# Patient Record
Sex: FEMALE | Race: BLACK OR AFRICAN AMERICAN | NOT HISPANIC OR LATINO | URBAN - METROPOLITAN AREA
[De-identification: names, ages, dates, MRNs, and addresses within clinical notes are randomized per-mention and may not be internally consistent; named-entity substitution may affect disease eponyms.]

---

## 2019-11-16 ENCOUNTER — EMERGENCY (EMERGENCY)
Facility: HOSPITAL | Age: 74
LOS: 1 days | Discharge: HOME | End: 2019-11-16
Attending: EMERGENCY MEDICINE
Payer: MEDICARE

## 2019-11-16 VITALS
HEIGHT: 69 IN | DIASTOLIC BLOOD PRESSURE: 147 MMHG | HEART RATE: 68 BPM | RESPIRATION RATE: 20 BRPM | TEMPERATURE: 98 F | OXYGEN SATURATION: 98 % | SYSTOLIC BLOOD PRESSURE: 193 MMHG | WEIGHT: 164.91 LBS

## 2019-11-16 VITALS
RESPIRATION RATE: 18 BRPM | SYSTOLIC BLOOD PRESSURE: 136 MMHG | DIASTOLIC BLOOD PRESSURE: 84 MMHG | OXYGEN SATURATION: 98 % | HEART RATE: 95 BPM

## 2019-11-16 LAB
ALBUMIN SERPL ELPH-MCNC: 4.7 G/DL — SIGNIFICANT CHANGE UP (ref 3.3–5.2)
ALP SERPL-CCNC: 119 U/L — SIGNIFICANT CHANGE UP (ref 40–120)
ALT FLD-CCNC: 13 U/L — SIGNIFICANT CHANGE UP
ANION GAP SERPL CALC-SCNC: 17 MMOL/L — SIGNIFICANT CHANGE UP (ref 5–17)
AST SERPL-CCNC: 20 U/L — SIGNIFICANT CHANGE UP
BASOPHILS # BLD AUTO: 0.03 K/UL — SIGNIFICANT CHANGE UP (ref 0–0.2)
BASOPHILS NFR BLD AUTO: 0.7 % — SIGNIFICANT CHANGE UP (ref 0–2)
BILIRUB SERPL-MCNC: 0.7 MG/DL — SIGNIFICANT CHANGE UP (ref 0.4–2)
BUN SERPL-MCNC: 12 MG/DL — SIGNIFICANT CHANGE UP (ref 8–20)
CALCIUM SERPL-MCNC: 11.1 MG/DL — HIGH (ref 8.6–10.2)
CHLORIDE SERPL-SCNC: 100 MMOL/L — SIGNIFICANT CHANGE UP (ref 98–107)
CO2 SERPL-SCNC: 23 MMOL/L — SIGNIFICANT CHANGE UP (ref 22–29)
CREAT SERPL-MCNC: 0.91 MG/DL — SIGNIFICANT CHANGE UP (ref 0.5–1.3)
EOSINOPHIL # BLD AUTO: 0.03 K/UL — SIGNIFICANT CHANGE UP (ref 0–0.5)
EOSINOPHIL NFR BLD AUTO: 0.7 % — SIGNIFICANT CHANGE UP (ref 0–6)
GLUCOSE SERPL-MCNC: 125 MG/DL — HIGH (ref 70–115)
HCT VFR BLD CALC: 43 % — SIGNIFICANT CHANGE UP (ref 34.5–45)
HGB BLD-MCNC: 13.3 G/DL — SIGNIFICANT CHANGE UP (ref 11.5–15.5)
IMM GRANULOCYTES NFR BLD AUTO: 0.2 % — SIGNIFICANT CHANGE UP (ref 0–1.5)
LYMPHOCYTES # BLD AUTO: 1.32 K/UL — SIGNIFICANT CHANGE UP (ref 1–3.3)
LYMPHOCYTES # BLD AUTO: 30.6 % — SIGNIFICANT CHANGE UP (ref 13–44)
MCHC RBC-ENTMCNC: 27.5 PG — SIGNIFICANT CHANGE UP (ref 27–34)
MCHC RBC-ENTMCNC: 30.9 GM/DL — LOW (ref 32–36)
MCV RBC AUTO: 88.8 FL — SIGNIFICANT CHANGE UP (ref 80–100)
MONOCYTES # BLD AUTO: 0.26 K/UL — SIGNIFICANT CHANGE UP (ref 0–0.9)
MONOCYTES NFR BLD AUTO: 6 % — SIGNIFICANT CHANGE UP (ref 2–14)
NEUTROPHILS # BLD AUTO: 2.66 K/UL — SIGNIFICANT CHANGE UP (ref 1.8–7.4)
NEUTROPHILS NFR BLD AUTO: 61.8 % — SIGNIFICANT CHANGE UP (ref 43–77)
PLATELET # BLD AUTO: 277 K/UL — SIGNIFICANT CHANGE UP (ref 150–400)
POTASSIUM SERPL-MCNC: 4.5 MMOL/L — SIGNIFICANT CHANGE UP (ref 3.5–5.3)
POTASSIUM SERPL-SCNC: 4.5 MMOL/L — SIGNIFICANT CHANGE UP (ref 3.5–5.3)
PROT SERPL-MCNC: 9.1 G/DL — HIGH (ref 6.6–8.7)
RBC # BLD: 4.84 M/UL — SIGNIFICANT CHANGE UP (ref 3.8–5.2)
RBC # FLD: 13 % — SIGNIFICANT CHANGE UP (ref 10.3–14.5)
SODIUM SERPL-SCNC: 140 MMOL/L — SIGNIFICANT CHANGE UP (ref 135–145)
TROPONIN T SERPL-MCNC: <0.01 NG/ML — SIGNIFICANT CHANGE UP (ref 0–0.06)
WBC # BLD: 4.31 K/UL — SIGNIFICANT CHANGE UP (ref 3.8–10.5)
WBC # FLD AUTO: 4.31 K/UL — SIGNIFICANT CHANGE UP (ref 3.8–10.5)

## 2019-11-16 PROCEDURE — 99284 EMERGENCY DEPT VISIT MOD MDM: CPT | Mod: 25,GC

## 2019-11-16 PROCEDURE — 85027 COMPLETE CBC AUTOMATED: CPT

## 2019-11-16 PROCEDURE — 96374 THER/PROPH/DIAG INJ IV PUSH: CPT | Mod: XU

## 2019-11-16 PROCEDURE — 93005 ELECTROCARDIOGRAM TRACING: CPT

## 2019-11-16 PROCEDURE — 71046 X-RAY EXAM CHEST 2 VIEWS: CPT

## 2019-11-16 PROCEDURE — 93010 ELECTROCARDIOGRAM REPORT: CPT

## 2019-11-16 PROCEDURE — 99284 EMERGENCY DEPT VISIT MOD MDM: CPT | Mod: 25

## 2019-11-16 PROCEDURE — 71046 X-RAY EXAM CHEST 2 VIEWS: CPT | Mod: 26

## 2019-11-16 PROCEDURE — 80053 COMPREHEN METABOLIC PANEL: CPT

## 2019-11-16 PROCEDURE — 36415 COLL VENOUS BLD VENIPUNCTURE: CPT

## 2019-11-16 PROCEDURE — 36000 PLACE NEEDLE IN VEIN: CPT | Mod: LT,XU

## 2019-11-16 PROCEDURE — 84484 ASSAY OF TROPONIN QUANT: CPT

## 2019-11-16 PROCEDURE — 71045 X-RAY EXAM CHEST 1 VIEW: CPT

## 2019-11-16 PROCEDURE — 71045 X-RAY EXAM CHEST 1 VIEW: CPT | Mod: 26,59

## 2019-11-16 RX ORDER — LABETALOL HCL 100 MG
100 TABLET ORAL ONCE
Refills: 0 | Status: COMPLETED | OUTPATIENT
Start: 2019-11-16 | End: 2019-11-16

## 2019-11-16 RX ORDER — LABETALOL HCL 100 MG
10 TABLET ORAL ONCE
Refills: 0 | Status: COMPLETED | OUTPATIENT
Start: 2019-11-16 | End: 2019-11-16

## 2019-11-16 RX ADMIN — Medication 100 MILLIGRAM(S): at 20:11

## 2019-11-16 RX ADMIN — Medication 10 MILLIGRAM(S): at 21:25

## 2019-11-16 NOTE — ED PROVIDER NOTE - PROGRESS NOTE DETAILS
AJM: VS improved pt feeling well. workup neg. pt has cards follow up this week. return precautions given with daughter present. nomral exam (neuro exam unchanged form baseline)

## 2019-11-16 NOTE — ED ADULT TRIAGE NOTE - CHIEF COMPLAINT QUOTE
Patient arrived today from [facility], daughter states patient is C/O chest pain, palpitations and weakness, HX of stroke 6-8 months.

## 2019-11-16 NOTE — ED PROVIDER NOTE - PATIENT PORTAL LINK FT
You can access the FollowMyHealth Patient Portal offered by [facility] by registering at the following website: [email or web address]. By joining [employer]’s FollowMyHealth portal, you will also be able to view your health information using other applications (apps) compatible with our system.

## 2019-11-16 NOTE — ED PROVIDER NOTE - NS ED ROS FT
General: Denies fever, chills  HEENT: Denies sensory changes, sore throat  Neck: Denies neck pain, neck stiffness  Resp: Denies coughing, SOB  Cardiovascular: Endorses palpitations. DEnies CP, LE edema  GI: Denies nausea, vomiting, abdominal pain  : Denies dysuria, hematuria, frequency  MSK: Denies back pain  Neuro: Denies HA, dizziness, numbness, weakness  Skin: Denies rashes.

## 2019-11-16 NOTE — ED PROVIDER NOTE - PHYSICAL EXAMINATION
General: Comfortable elderly female in no acute distress.  HEENT: Normocephalic, atraumatic. Moist mucous membranes.    Eyes: No scleral icterus. EOMI. Pupils equally reactive to light and accomodation.   Neck:. Soft and supple. Full ROM without pain. No midline tenderness  Cardiac: Regular rate and regular rhythm. +S1/S2. No murmurs, rubs, gallops. Peripheral pulses 2+ and symmetric. No LE edema.  Resp: Lungs CTAB. Speaking in full sentences. No evidence of respiratory distress. No wheezes, rales or rhonchi.  Abd: Soft, non-tender, non-distended. No guarding or rebound. No scars, masses, or lesions.  Back: Spine midline and non-tender. No CVA tenderness.    Skin: No rashes, abrasions, or lacerations.  Neuro: Awake, alert & oriented x 3. Motor strength 4/5 LUE, LLE. 5/5 RUE, RLE. Sensation grossly intact. General: Comfortable elderly female in no acute distress.  HEENT: Normocephalic, atraumatic. Moist mucous membranes.    Eyes: No scleral icterus. EOMI. Pupils equally reactive to light and accomodation.   Neck:. Soft and supple. Full ROM without pain. No midline tenderness  Cardiac: Regular rate and regular rhythm. +S1/S2. No murmurs, rubs, gallops. Peripheral pulses 2+ and symmetric. No LE edema.  Resp: Lungs CTAB. Speaking in full sentences. No evidence of respiratory distress. No wheezes, rales or rhonchi.  Abd: Soft, non-tender, non-distended. No guarding or rebound. No scars, masses, or lesions.  Back: Spine midline and non-tender. No CVA tenderness.    Skin: No rashes, abrasions, or lacerations.  Neuro: Awake, alert & oriented x 3. Motor strength 4/5 LUE, LLE. 5/5 RUE, RLE (pts baseline for the past 1-2 years)  Sensation grossly intact.

## 2019-11-16 NOTE — ED PROVIDER NOTE - CLINICAL SUMMARY MEDICAL DECISION MAKING FREE TEXT BOX
Patitent with palpitations that have now resolved. Labwork and imaging unremarkable. Patient improved. Daughter confirms that patient has apponitment with [facility] this week. Patient comfortable appearing, will send home with follow up.

## 2019-11-16 NOTE — ED PROVIDER NOTE - OBJECTIVE STATEMENT
Pt is a 75yo F with no PMH presenting with palpitations. Patient states on the plane from [facility] two hours prior to presentation she began to feel strong palpitations and weakness. Patient states that with her daughter on the way to the hospital she felt better and the palpitations resolved. AS per daughter patient has a history of stroke 1 year prior with some persistent mild left sided weakness. Patient states that she does not see physicians and the only medication she takes is aspirin 81. She denies any active chest pain, SOB, syncope, dizziness, headache, fever, chills, cough.

## 2019-11-17 NOTE — ED ADULT NURSE REASSESSMENT NOTE - NS ED NURSE REASSESS COMMENT FT1
Pt. safe for discharge as per provider. Vital signs stable and as charted. Pt. at baseline neuro status. Discussed discharge teaching with pt, pt verbalized understanding. IV catheter discontinued. Pt. discharged by Dr. Villar.

## 2019-11-18 RX ORDER — LABETALOL HCL 100 MG
1 TABLET ORAL
Qty: 6 | Refills: 0
Start: 2019-11-18 | End: 2019-11-20

## 2019-12-21 ENCOUNTER — INPATIENT (INPATIENT)
Facility: HOSPITAL | Age: 74
LOS: 5 days | Discharge: HOME | DRG: 65 | End: 2019-12-27
Attending: HOSPITALIST | Admitting: STUDENT IN AN ORGANIZED HEALTH CARE EDUCATION/TRAINING PROGRAM
Payer: MEDICARE

## 2019-12-21 VITALS
HEART RATE: 77 BPM | OXYGEN SATURATION: 97 % | RESPIRATION RATE: 18 BRPM | DIASTOLIC BLOOD PRESSURE: 97 MMHG | SYSTOLIC BLOOD PRESSURE: 144 MMHG

## 2019-12-21 DIAGNOSIS — I63.9 CEREBRAL INFARCTION, UNSPECIFIED: ICD-10-CM

## 2019-12-21 LAB
ALBUMIN SERPL ELPH-MCNC: 4.6 G/DL — SIGNIFICANT CHANGE UP (ref 3.3–5.2)
ALP SERPL-CCNC: 117 U/L — SIGNIFICANT CHANGE UP (ref 40–120)
ALT FLD-CCNC: 19 U/L — SIGNIFICANT CHANGE UP
ANION GAP SERPL CALC-SCNC: 13 MMOL/L — SIGNIFICANT CHANGE UP (ref 5–17)
APPEARANCE UR: ABNORMAL
APTT BLD: 27.5 SEC — SIGNIFICANT CHANGE UP (ref 27.5–36.3)
AST SERPL-CCNC: 30 U/L — SIGNIFICANT CHANGE UP
BASOPHILS # BLD AUTO: 0.03 K/UL — SIGNIFICANT CHANGE UP (ref 0–0.2)
BASOPHILS NFR BLD AUTO: 0.6 % — SIGNIFICANT CHANGE UP (ref 0–2)
BILIRUB SERPL-MCNC: 0.8 MG/DL — SIGNIFICANT CHANGE UP (ref 0.4–2)
BILIRUB UR-MCNC: NEGATIVE — SIGNIFICANT CHANGE UP
BUN SERPL-MCNC: 13 MG/DL — SIGNIFICANT CHANGE UP (ref 8–20)
CALCIUM SERPL-MCNC: 10.9 MG/DL — HIGH (ref 8.6–10.2)
CHLORIDE SERPL-SCNC: 100 MMOL/L — SIGNIFICANT CHANGE UP (ref 98–107)
CO2 SERPL-SCNC: 25 MMOL/L — SIGNIFICANT CHANGE UP (ref 22–29)
COLOR SPEC: YELLOW — SIGNIFICANT CHANGE UP
COMMENT - URINE: SIGNIFICANT CHANGE UP
CREAT SERPL-MCNC: 0.79 MG/DL — SIGNIFICANT CHANGE UP (ref 0.5–1.3)
DIFF PNL FLD: NEGATIVE — SIGNIFICANT CHANGE UP
EOSINOPHIL # BLD AUTO: 0.17 K/UL — SIGNIFICANT CHANGE UP (ref 0–0.5)
EOSINOPHIL NFR BLD AUTO: 3.6 % — SIGNIFICANT CHANGE UP (ref 0–6)
EPI CELLS # UR: SIGNIFICANT CHANGE UP
GLUCOSE SERPL-MCNC: 103 MG/DL — SIGNIFICANT CHANGE UP (ref 70–115)
GLUCOSE UR QL: NEGATIVE MG/DL — SIGNIFICANT CHANGE UP
HCT VFR BLD CALC: 40.7 % — SIGNIFICANT CHANGE UP (ref 34.5–45)
HGB BLD-MCNC: 12.8 G/DL — SIGNIFICANT CHANGE UP (ref 11.5–15.5)
IMM GRANULOCYTES NFR BLD AUTO: 0.2 % — SIGNIFICANT CHANGE UP (ref 0–1.5)
INR BLD: 1.08 RATIO — SIGNIFICANT CHANGE UP (ref 0.88–1.16)
KETONES UR-MCNC: NEGATIVE — SIGNIFICANT CHANGE UP
LEUKOCYTE ESTERASE UR-ACNC: NEGATIVE — SIGNIFICANT CHANGE UP
LYMPHOCYTES # BLD AUTO: 1.58 K/UL — SIGNIFICANT CHANGE UP (ref 1–3.3)
LYMPHOCYTES # BLD AUTO: 33.9 % — SIGNIFICANT CHANGE UP (ref 13–44)
MAGNESIUM SERPL-MCNC: 2.6 MG/DL — SIGNIFICANT CHANGE UP (ref 1.6–2.6)
MCHC RBC-ENTMCNC: 27.9 PG — SIGNIFICANT CHANGE UP (ref 27–34)
MCHC RBC-ENTMCNC: 31.4 GM/DL — LOW (ref 32–36)
MCV RBC AUTO: 88.7 FL — SIGNIFICANT CHANGE UP (ref 80–100)
MONOCYTES # BLD AUTO: 0.41 K/UL — SIGNIFICANT CHANGE UP (ref 0–0.9)
MONOCYTES NFR BLD AUTO: 8.8 % — SIGNIFICANT CHANGE UP (ref 2–14)
NEUTROPHILS # BLD AUTO: 2.46 K/UL — SIGNIFICANT CHANGE UP (ref 1.8–7.4)
NEUTROPHILS NFR BLD AUTO: 52.9 % — SIGNIFICANT CHANGE UP (ref 43–77)
NITRITE UR-MCNC: NEGATIVE — SIGNIFICANT CHANGE UP
PH UR: 8 — SIGNIFICANT CHANGE UP (ref 5–8)
PLATELET # BLD AUTO: 276 K/UL — SIGNIFICANT CHANGE UP (ref 150–400)
POTASSIUM SERPL-MCNC: 4.7 MMOL/L — SIGNIFICANT CHANGE UP (ref 3.5–5.3)
POTASSIUM SERPL-SCNC: 4.7 MMOL/L — SIGNIFICANT CHANGE UP (ref 3.5–5.3)
PROT SERPL-MCNC: 8.6 G/DL — SIGNIFICANT CHANGE UP (ref 6.6–8.7)
PROT UR-MCNC: NEGATIVE MG/DL — SIGNIFICANT CHANGE UP
PROTHROM AB SERPL-ACNC: 12.4 SEC — SIGNIFICANT CHANGE UP (ref 10–12.9)
RBC # BLD: 4.59 M/UL — SIGNIFICANT CHANGE UP (ref 3.8–5.2)
RBC # FLD: 13.1 % — SIGNIFICANT CHANGE UP (ref 10.3–14.5)
RBC CASTS # UR COMP ASSIST: SIGNIFICANT CHANGE UP /HPF (ref 0–4)
SODIUM SERPL-SCNC: 138 MMOL/L — SIGNIFICANT CHANGE UP (ref 135–145)
SP GR SPEC: 1.01 — SIGNIFICANT CHANGE UP (ref 1.01–1.02)
TROPONIN T SERPL-MCNC: <0.01 NG/ML — SIGNIFICANT CHANGE UP (ref 0–0.06)
UROBILINOGEN FLD QL: NEGATIVE MG/DL — SIGNIFICANT CHANGE UP
WBC # BLD: 4.66 K/UL — SIGNIFICANT CHANGE UP (ref 3.8–10.5)
WBC # FLD AUTO: 4.66 K/UL — SIGNIFICANT CHANGE UP (ref 3.8–10.5)
WBC UR QL: SIGNIFICANT CHANGE UP

## 2019-12-21 PROCEDURE — 93010 ELECTROCARDIOGRAM REPORT: CPT

## 2019-12-21 PROCEDURE — 70496 CT ANGIOGRAPHY HEAD: CPT | Mod: 26

## 2019-12-21 PROCEDURE — 99223 1ST HOSP IP/OBS HIGH 75: CPT

## 2019-12-21 PROCEDURE — 72125 CT NECK SPINE W/O DYE: CPT | Mod: 26

## 2019-12-21 PROCEDURE — 70450 CT HEAD/BRAIN W/O DYE: CPT | Mod: 26,59

## 2019-12-21 PROCEDURE — 99284 EMERGENCY DEPT VISIT MOD MDM: CPT

## 2019-12-21 PROCEDURE — 70498 CT ANGIOGRAPHY NECK: CPT | Mod: 26

## 2019-12-21 PROCEDURE — 71045 X-RAY EXAM CHEST 1 VIEW: CPT | Mod: 26

## 2019-12-21 RX ORDER — HEPARIN SODIUM 5000 [USP'U]/ML
5000 INJECTION INTRAVENOUS; SUBCUTANEOUS EVERY 8 HOURS
Refills: 0 | Status: DISCONTINUED | OUTPATIENT
Start: 2019-12-21 | End: 2019-12-27

## 2019-12-21 RX ORDER — ATORVASTATIN CALCIUM 80 MG/1
80 TABLET, FILM COATED ORAL AT BEDTIME
Refills: 0 | Status: DISCONTINUED | OUTPATIENT
Start: 2019-12-21 | End: 2019-12-27

## 2019-12-21 RX ORDER — ASPIRIN/CALCIUM CARB/MAGNESIUM 324 MG
300 TABLET ORAL ONCE
Refills: 0 | Status: DISCONTINUED | OUTPATIENT
Start: 2019-12-21 | End: 2019-12-21

## 2019-12-21 RX ORDER — ASPIRIN/CALCIUM CARB/MAGNESIUM 324 MG
81 TABLET ORAL ONCE
Refills: 0 | Status: DISCONTINUED | OUTPATIENT
Start: 2019-12-21 | End: 2019-12-21

## 2019-12-21 RX ORDER — ASPIRIN/CALCIUM CARB/MAGNESIUM 324 MG
325 TABLET ORAL ONCE
Refills: 0 | Status: COMPLETED | OUTPATIENT
Start: 2019-12-21 | End: 2019-12-21

## 2019-12-21 RX ORDER — ACETAMINOPHEN 500 MG
650 TABLET ORAL EVERY 6 HOURS
Refills: 0 | Status: DISCONTINUED | OUTPATIENT
Start: 2019-12-21 | End: 2019-12-27

## 2019-12-21 RX ORDER — MORPHINE SULFATE 50 MG/1
2 CAPSULE, EXTENDED RELEASE ORAL ONCE
Refills: 0 | Status: DISCONTINUED | OUTPATIENT
Start: 2019-12-21 | End: 2019-12-21

## 2019-12-21 RX ORDER — ASPIRIN/CALCIUM CARB/MAGNESIUM 324 MG
81 TABLET ORAL DAILY
Refills: 0 | Status: DISCONTINUED | OUTPATIENT
Start: 2019-12-21 | End: 2019-12-27

## 2019-12-21 RX ADMIN — MORPHINE SULFATE 2 MILLIGRAM(S): 50 CAPSULE, EXTENDED RELEASE ORAL at 16:08

## 2019-12-21 RX ADMIN — Medication 81 MILLIGRAM(S): at 22:17

## 2019-12-21 RX ADMIN — ATORVASTATIN CALCIUM 80 MILLIGRAM(S): 80 TABLET, FILM COATED ORAL at 22:20

## 2019-12-21 RX ADMIN — HEPARIN SODIUM 5000 UNIT(S): 5000 INJECTION INTRAVENOUS; SUBCUTANEOUS at 22:20

## 2019-12-21 NOTE — H&P ADULT - ASSESSMENT
74yoF hx prior CVA with residual left sided weakness, HTN presenting with left sided facial droop and slurred speech, being admitted for CVA work up    Left sided facial droop/dysarthria  -CT head showing hypodensity in right frontal lobe concerning for acute/subacute infarct with chronic right temporal occipital infarct with superimposed age-indeterminate infarct  -Admit to step down until for q2hr neuro checks  -Passed dysphagia screen, start ASA and high potency statin  -TTE, MRI brain and carotid US ordered   -CTA head showing intracranial atherosclerosis contributing to severe stenosis of the right supraclinoid internal carotid artery, however CTA neck negative for any carotid or vertebral artery stenosis  -Carotid US ordered to further assess carotid artery given stenosis given above findings   -PT and OT eval   -Check HgbA1c and lipid panel  -Neurology ([facility]) consulted by ED, will see in AM    HTN  -Holding labetalol to allow for permissive HTN for about 24-48hr    Left hip pain  -Pt with recent fall, CT head with above findings  -X-ray hip ordered to assess for fracture   -Pain control with Tylenol    CT abnormality on cervical spine  -Numerous small rounded lucencies scattered throughout the visualized spine on CT cervical spine with nonemergent follow up MRI and/or bone scan recommended  -Findings of CT cervical spine explained by tele-hospitalist, Dr. Delaney    Prophylactic measure   -Intermittent pneumatic compressions

## 2019-12-21 NOTE — CONSULT NOTE ADULT - SUBJECTIVE AND OBJECTIVE BOX
REASON FOR CONSULT: telehospitalist evaluation        HPI: Pt is a 73 yo F presenting from home with facial droop and slurred speech. PMH CVA approx 1 month ago, HTN.   Patient seen via telehospitalist consultation. She speaks some english but her primary language is Creole.   Patient is a poor historian and she cannot provide much PMH or any of the medications she is on or when her last stroke was. History also obtained from patients daughter Faye Flores (as well as patients grandson) at [phone] and she was able to provide medication list.   As per patient and her grandson she had increased weakness since she woke up this morning, at baseline she has L sided weakness after her last stroke but now is having some weakness in her R arm and family also noted that she had slurred speech. Exact time of symptom onset is unclear, she was last known to be at her baseline last night around 9PM. Family believes she is getting weaker as time goes on over past few days. Denies headaches, nausea, vomiting, chest pain, SOB, palpitations, abdominal pain, constipation, diarrhea, melena, hematochezia, dysuria.     In ED patient had imaging done and showed Noncontrast CT Head: Redemonstration of areas of hypodensity within the right frontal lobe, concerning for acute/subacute infarct. Chronic right temporal occipital infarct with possible superimposed age-indeterminate infarct. Stable left medial frontal lobe hypodensity. No acute intracranial hemorrhage or significant mass effect.    CT cervical spine: No acute cervical spinal fracture or evidence of traumatic malalignment. Numerous small rounded lucencies scattered throughout the visualized spine. Further evaluation with nonemergent MRI and/or bone scan is recommended as findings may be seen in the setting of underlying malignancy.    CTA Neck: No flow-limiting stenosis or occlusion within the cervical carotid or vertebral arteries.     CTA Head: Intracranial atherosclerosis contributing to severe stenosis of the right supraclinoid internal carotid artery. Additional areas of scattered intracranial luminal stenoses, including the mid basilar segment, which demonstrates mild luminal stenosis.        PSH: Denies past surgeries    Social Hx: Denies use of etoh, tobacco and drug use.     Family Hx: Denies family hx of CVA in parents    Meds: see med rec    Allergies: NKDA          OBJECTIVE    Vital Signs Last 24 Hrs  T(C): --  T(F): --  HR: 81 (21 Dec 2019 19:30) (77 - 81)  BP: 139/99 (21 Dec 2019 19:30) (139/99 - 144/97)  BP(mean): --  RR: 22 (21 Dec 2019 19:30) (18 - 22)  SpO2: 97% (21 Dec 2019 19:30) (97% - 97%)        PHYSICAL EXAM: Tele-evaluation precludes physical exam.   General: Patient appears well in no acute distress, speech is fluent      LABS AND IMAGING DATA:                        12.8   4.66  )-----------( 276      ( 21 Dec 2019 13:53 )             40.7     12    138  |  100  |  13.0  ----------------------------<  103  4.7   |  25.0  |  0.79    Ca    10.9<H>      21 Dec 2019 13:53  Mg     2.6         TPro  8.6  /  Alb  4.6  /  TBili  0.8  /  DBili  x   /  AST  30  /  ALT  19  /  AlkPhos  117  12-    Urinalysis Basic - ( 21 Dec 2019 16:21 )    Color: Yellow / Appearance: Slightly Turbid / S.010 / pH: x  Gluc: x / Ketone: Negative  / Bili: Negative / Urobili: Negative mg/dL   Blood: x / Protein: Negative mg/dL / Nitrite: Negative   Leuk Esterase: Negative / RBC: 0-2 /HPF / WBC 0-2   Sq Epi: x / Non Sq Epi: Few / Bacteria: x          ASSESSMENT AND PLAN:     CVA: Place on stroke pathway.   -obtain A1c, lipids, MRI brain (no contraindications to MRI).   -neurochecks as ordered.   -consult neurology (neuro consult called by ED-Dr [facility]'s group)  -ASA daily.   -start statin  -NPO until passes dysphagia screen  -PT consult  -CT reviewed as stated above and showed R frontal lobe acute or subacute infarct as well as her chronic infarcts as stated above.   -also noted to have intracranial atherosclerosis contributing to severe stenosis of the right supraclinoid internal carotid artery-f/u neuro recommendations    HTN: permissive HTN  -will hold BP meds for right now.     Abnormal lucencies noted on CT of the spine: family informed and will need additional testing as this may be malignant/due to cancer and will need continued followed up    Care plan discussed with Dr. García

## 2019-12-21 NOTE — ED PROVIDER NOTE - OBJECTIVE STATEMENT
74yoF with h/o L hemiparesis from old stroke, which occurred in the last several months (apparently L leg weakness 6 mo ago and L arm weakness 1 mo ago),brought in by family after found to have slurred speech and R gaze preference onset sometime since last night. last known well definitive  9 PM. Per grandson, he came into her room several times through the night to help carry her to the bathroom ( she requires much assistance due to L leg weakness) and he did not notice if she had a facial droop - last time he saw her was between 5-6AM).

## 2019-12-21 NOTE — ED ADULT NURSE REASSESSMENT NOTE - NS ED NURSE REASSESS COMMENT FT1
Pt received at 1930. Chart as noted. Pt is A&OX3. Pt denies pain, N/V/D. Pt VSS, NSR on cardiac monitor. Pt in NAD at this time. Pt moves all extremities equal and bilateral. Plan of care and wait time explained. Pt awaiting admit orders.  Safety maintained.

## 2019-12-21 NOTE — ED ADULT NURSE NOTE - CHIEF COMPLAINT QUOTE
pt awake and alert, sudden onset of left sided facial droop and right sided gaze since 0700. last known well 2100 last night. MD Panchal @ bedside for eval.

## 2019-12-21 NOTE — ED ADULT NURSE NOTE - OBJECTIVE STATEMENT
Pt awake and alert brought in by EMS c/o Left side facial droop and right side gaze starting last night per EMS approx 2100, pt noticed to have symptoms by family at 0700. Hx of CVA with residual LUE and LLE paralysis. RR even and unlabored, pt acting baseline ms per family. #22G IV placed to right hand, blood work sent to lab. safety maintained, call bell in reach, family remains bedside

## 2019-12-21 NOTE — H&P ADULT - NSHPPHYSICALEXAM_GEN_ALL_CORE
Vital Signs Last 24 Hrs  T(C): 37 (21 Dec 2019 23:44), Max: 37 (21 Dec 2019 23:44)  T(F): 98.6 (21 Dec 2019 23:44), Max: 98.6 (21 Dec 2019 23:44)  HR: 85 (22 Dec 2019 04:00) (71 - 85)  BP: 127/89 (22 Dec 2019 04:00) (127/89 - 156/104)  BP(mean): 101 (22 Dec 2019 04:00) (88 - 120)  RR: 14 (22 Dec 2019 04:00) (12 - 22)  SpO2: 99% (22 Dec 2019 04:00) (97% - 100%)    GENERAL:  Well-appearing elderly female, not in acute distress  EYES:  Clear conjunctiva, extraocular movement intact  ENT: Moist mucous membranes  RESP:  Non-labored breathing pattern, lungs clear to ausculation   CV: Regular rate and rhythm, no murmurs appreciated, no lower extremity edema  GI: Soft, non-tender, non-distended  NEURO: Awake, alert, conversant, LUE contracted (chronic), pt having some difficulty understanding neuro exam, but able to move RUE and RLE against gravity with some resistance (about 4/5 strength), barely able to lift LLE off bed (about 1/5 strength)   PSYCH: Calm, cooperative  SKIN: No rash or lesions, warm and dry

## 2019-12-21 NOTE — H&P ADULT - HISTORY OF PRESENT ILLNESS
74yoF hx prior CVA with residual left sided weakness, HTN presenting with left sided facial droop and slurred speech.  Pt Pan[place]ian Creople speaking, knows limited English, so hx obtained from chart review and verbal sign out from tele-hospitalist who spoke with pt daughter Faye and pt grandson Mark.  Only granddaughter Sheryl currently at bedside and can provide limited hx.  Pt has baseline left sided weakness from recent CVA she had 1 month ago (granddaughter reports she was at [facility] during CVA, however no record in EMR.  Pt unable to move her left arm due to contracture after stroke and ambulates with a walker.  Earlier this AM, they noticed that she had a left sided facial droop and her speech was not clear.  Per granddaughter at bedside, pt still having slurred speech when she talks.  Pt also had recent fall with impact to her left side and currently endorses left hip pain to me.     On admission, CT head shows re-demonstration of areas of hypodensity within the right frontal lobe, concerning for acute/subacute infarct, chronic right temporal occipital infarct with possible superimposed age-indeterminate infarct and stable left medial frontal lobe hypodensity.  CT cervical spine showed numerous round luncencies with follow up non-emergent MRI or bone scan recommended. 74yoF hx prior CVA with residual left sided weakness, HTN presenting with left sided facial droop and slurred speech.  Pt [place] Creople speaking, knows limited English, so hx obtained from chart review and verbal sign out from tele-hospitalist who spoke with pt daughter Faye and pt grandson Mark.  Only granddaughter Sheryl currently at bedside and can provide limited hx.  Pt has baseline left sided weakness from recent CVA she had 1 month ago (granddaughter reports she was at [facility] during CVA, however no record in EMR.  Pt unable to move her left arm due to contracture after stroke and ambulates with a walker.  Earlier this AM, they noticed that she had a left sided facial droop and her speech was not clear.  Per granddaughter at bedside, pt still having slurred speech when she talks.  Pt also had recent fall with impact to her left side and currently endorses left hip pain to me.     On admission, CT head shows re-demonstration of areas of hypodensity within the right frontal lobe, concerning for acute/subacute infarct, chronic right temporal occipital infarct with possible superimposed age-indeterminate infarct and stable left medial frontal lobe hypodensity.  CT cervical spine showed numerous round luncencies with follow up non-emergent MRI or bone scan recommended.

## 2019-12-21 NOTE — ED PROVIDER NOTE - CLINICAL SUMMARY MEDICAL DECISION MAKING FREE TEXT BOX
Pt with h/o L hemiparesis pw new onset L facial droop and slurred speech;  Unclear of prior workups;  Pt out of TPA window as LNK last night; will check CT/ labs. / admi

## 2019-12-21 NOTE — H&P ADULT - HISTORY OF PRESENT ILLNESS
REASON FOR CONSULT: telehospitalist evaluation        HPI: Pt is a 73 yo F presenting from home with facial droop and slurred speech. PMH CVA approx 1 month ago, HTN.   Patient seen via telehospitalist consultation. She speaks some english but her primary language is Creole.   Patient is a poor historian and she cannot provide much PMH or any of the medications she is on or when her last stroke was. History also obtained from patients daughter Faye Flores (as well as patients grandson) at [phone] and she was able to provide medication list.   As per patient and her grandson she had increased weakness since she woke up this morning, at baseline she has L sided weakness after her last stroke but now is having some weakness in her R arm and family also noted that she had slurred speech. Exact time of symptom onset is unclear, she was last known to be at her baseline last night around 9PM. Family believes she is getting weaker as time goes on over past few days. Denies headaches, nausea, vomiting, chest pain, SOB, palpitations, abdominal pain, constipation, diarrhea, melena, hematochezia, dysuria.     In ED patient had imaging done and showed Noncontrast CT Head: Redemonstration of areas of hypodensity within the right frontal lobe, concerning for acute/subacute infarct. Chronic right temporal occipital infarct with possible superimposed age-indeterminate infarct. Stable left medial frontal lobe hypodensity. No acute intracranial hemorrhage or significant mass effect.    CT cervical spine: No acute cervical spinal fracture or evidence of traumatic malalignment. Numerous small rounded lucencies scattered throughout the visualized spine. Further evaluation with nonemergent MRI and/or bone scan is recommended as findings may be seen in the setting of underlying malignancy.    CTA Neck: No flow-limiting stenosis or occlusion within the cervical carotid or vertebral arteries.     CTA Head: Intracranial atherosclerosis contributing to severe stenosis of the right supraclinoid internal carotid artery. Additional areas of scattered intracranial luminal stenoses, including the mid basilar segment, which demonstrates mild luminal stenosis.        PSH: Denies past surgeries    Social Hx: Denies use of etoh, tobacco and drug use.     Family Hx: Denies family hx of CVA in parents    Meds: see med rec    Allergies: NKDA          OBJECTIVE    Vital Signs Last 24 Hrs  T(C): --  T(F): --  HR: 81 (21 Dec 2019 19:30) (77 - 81)  BP: 139/99 (21 Dec 2019 19:30) (139/99 - 144/97)  BP(mean): --  RR: 22 (21 Dec 2019 19:30) (18 - 22)  SpO2: 97% (21 Dec 2019 19:30) (97% - 97%)        PHYSICAL EXAM: Tele-evaluation precludes physical exam.   General: Patient appears well in no acute distress, speech is fluent      LABS AND IMAGING DATA:                        12.8   4.66  )-----------( 276      ( 21 Dec 2019 13:53 )             40.7     12    138  |  100  |  13.0  ----------------------------<  103  4.7   |  25.0  |  0.79    Ca    10.9<H>      21 Dec 2019 13:53  Mg     2.6         TPro  8.6  /  Alb  4.6  /  TBili  0.8  /  DBili  x   /  AST  30  /  ALT  19  /  AlkPhos  117  12-    Urinalysis Basic - ( 21 Dec 2019 16:21 )    Color: Yellow / Appearance: Slightly Turbid / S.010 / pH: x  Gluc: x / Ketone: Negative  / Bili: Negative / Urobili: Negative mg/dL   Blood: x / Protein: Negative mg/dL / Nitrite: Negative   Leuk Esterase: Negative / RBC: 0-2 /HPF / WBC 0-2   Sq Epi: x / Non Sq Epi: Few / Bacteria: x                ASSESSMENT AND PLAN:     CVA: Place on stroke pathway.   -obtain A1c, lipids, MRI brain (no contraindications to MRI).   -neurochecks as ordered.   -consult neurology (neuro consult called by ED-Dr Cody's group)  -ASA daily.   -start statin  -NPO until passes dysphagia screen  -PT consult  -CT reviewed as stated above and showed R frontal lobe acute or subacute infarct as well as her chronic infarcts as stated above.   -also noted to have intracranial atherosclerosis contributing to severe stenosis of the right supraclinoid internal carotid artery-f/u neuro recommendations    HTN: permissive HTN  -will hold BP meds for right now.     Abnormal lucencies noted on CT of the spine: family informed and will need additional testing as this may be malignant/due to cancer and will need continued followed up    Care plan discussed with Dr. García

## 2019-12-21 NOTE — ED PROVIDER NOTE - EYES, MLM
Clear bilaterally, pupils equal, round and reactive to light. Rward gaze preference; can be overcome

## 2019-12-21 NOTE — H&P ADULT - NSHPLABSRESULTS_GEN_ALL_CORE
12.8   4.66  )-----------( 276      ( 21 Dec 2019 13:53 )             40.7       12-21    138  |  100  |  13.0  ----------------------------<  103  4.7   |  25.0  |  0.79    Ca    10.9<H>      21 Dec 2019 13:53  Mg     2.6     12-21    TPro  8.6  /  Alb  4.6  /  TBili  0.8  /  DBili  x   /  AST  30  /  ALT  19  /  AlkPhos  117  12-21

## 2019-12-21 NOTE — ED ADULT NURSE NOTE - NSIMPLEMENTINTERV_GEN_ALL_ED
Implemented All Fall with Harm Risk Interventions:  [place] to call system. Call bell, personal items and telephone within reach. Instruct patient to call for assistance. Room bathroom lighting operational. Non-slip footwear when patient is off stretcher. Physically safe environment: no spills, clutter or unnecessary equipment. Stretcher in lowest position, wheels locked, appropriate side rails in place. Provide visual cue, wrist band, yellow gown, etc. Monitor gait and stability. Monitor for mental status changes and reorient to person, place, and time. Review medications for side effects contributing to fall risk. Reinforce activity limits and safety measures with patient and family. Provide visual clues: red socks.

## 2019-12-22 LAB
ANION GAP SERPL CALC-SCNC: 15 MMOL/L — SIGNIFICANT CHANGE UP (ref 5–17)
BUN SERPL-MCNC: 11 MG/DL — SIGNIFICANT CHANGE UP (ref 8–20)
CALCIUM SERPL-MCNC: 10.8 MG/DL — HIGH (ref 8.6–10.2)
CHLORIDE SERPL-SCNC: 101 MMOL/L — SIGNIFICANT CHANGE UP (ref 98–107)
CHOLEST SERPL-MCNC: 207 MG/DL — HIGH (ref 110–199)
CO2 SERPL-SCNC: 24 MMOL/L — SIGNIFICANT CHANGE UP (ref 22–29)
CREAT SERPL-MCNC: 0.79 MG/DL — SIGNIFICANT CHANGE UP (ref 0.5–1.3)
GLUCOSE SERPL-MCNC: 92 MG/DL — SIGNIFICANT CHANGE UP (ref 70–115)
HCV AB S/CO SERPL IA: 0.08 S/CO — SIGNIFICANT CHANGE UP (ref 0–0.99)
HCV AB SERPL-IMP: SIGNIFICANT CHANGE UP
HDLC SERPL-MCNC: 50 MG/DL — SIGNIFICANT CHANGE UP
LIPID PNL WITH DIRECT LDL SERPL: 137 MG/DL — SIGNIFICANT CHANGE UP
POTASSIUM SERPL-MCNC: 3.7 MMOL/L — SIGNIFICANT CHANGE UP (ref 3.5–5.3)
POTASSIUM SERPL-SCNC: 3.7 MMOL/L — SIGNIFICANT CHANGE UP (ref 3.5–5.3)
SODIUM SERPL-SCNC: 140 MMOL/L — SIGNIFICANT CHANGE UP (ref 135–145)
TOTAL CHOLESTEROL/HDL RATIO MEASUREMENT: 4 RATIO — SIGNIFICANT CHANGE UP (ref 3.3–7.1)
TRIGL SERPL-MCNC: 100 MG/DL — SIGNIFICANT CHANGE UP (ref 10–200)

## 2019-12-22 PROCEDURE — 93880 EXTRACRANIAL BILAT STUDY: CPT | Mod: 26

## 2019-12-22 PROCEDURE — 70551 MRI BRAIN STEM W/O DYE: CPT | Mod: 26

## 2019-12-22 PROCEDURE — 73502 X-RAY EXAM HIP UNI 2-3 VIEWS: CPT | Mod: 26,LT

## 2019-12-22 PROCEDURE — 99233 SBSQ HOSP IP/OBS HIGH 50: CPT

## 2019-12-22 RX ORDER — MORPHINE SULFATE 50 MG/1
1 CAPSULE, EXTENDED RELEASE ORAL ONCE
Refills: 0 | Status: DISCONTINUED | OUTPATIENT
Start: 2019-12-22 | End: 2019-12-22

## 2019-12-22 RX ORDER — LOSARTAN POTASSIUM 100 MG/1
50 TABLET, FILM COATED ORAL DAILY
Refills: 0 | Status: DISCONTINUED | OUTPATIENT
Start: 2019-12-22 | End: 2019-12-23

## 2019-12-22 RX ORDER — HYDRALAZINE HCL 50 MG
25 TABLET ORAL ONCE
Refills: 0 | Status: DISCONTINUED | OUTPATIENT
Start: 2019-12-22 | End: 2019-12-22

## 2019-12-22 RX ORDER — HYDRALAZINE HCL 50 MG
10 TABLET ORAL ONCE
Refills: 0 | Status: COMPLETED | OUTPATIENT
Start: 2019-12-22 | End: 2019-12-23

## 2019-12-22 RX ORDER — LABETALOL HCL 100 MG
50 TABLET ORAL
Refills: 0 | Status: DISCONTINUED | OUTPATIENT
Start: 2019-12-22 | End: 2019-12-23

## 2019-12-22 RX ADMIN — Medication 650 MILLIGRAM(S): at 01:30

## 2019-12-22 RX ADMIN — MORPHINE SULFATE 1 MILLIGRAM(S): 50 CAPSULE, EXTENDED RELEASE ORAL at 22:30

## 2019-12-22 RX ADMIN — Medication 650 MILLIGRAM(S): at 00:48

## 2019-12-22 RX ADMIN — Medication 50 MILLIGRAM(S): at 17:34

## 2019-12-22 RX ADMIN — ATORVASTATIN CALCIUM 80 MILLIGRAM(S): 80 TABLET, FILM COATED ORAL at 20:55

## 2019-12-22 RX ADMIN — HEPARIN SODIUM 5000 UNIT(S): 5000 INJECTION INTRAVENOUS; SUBCUTANEOUS at 13:09

## 2019-12-22 RX ADMIN — HEPARIN SODIUM 5000 UNIT(S): 5000 INJECTION INTRAVENOUS; SUBCUTANEOUS at 05:55

## 2019-12-22 RX ADMIN — MORPHINE SULFATE 1 MILLIGRAM(S): 50 CAPSULE, EXTENDED RELEASE ORAL at 20:55

## 2019-12-22 RX ADMIN — Medication 325 MILLIGRAM(S): at 00:48

## 2019-12-22 RX ADMIN — Medication 81 MILLIGRAM(S): at 12:57

## 2019-12-22 RX ADMIN — HEPARIN SODIUM 5000 UNIT(S): 5000 INJECTION INTRAVENOUS; SUBCUTANEOUS at 20:55

## 2019-12-22 RX ADMIN — LOSARTAN POTASSIUM 50 MILLIGRAM(S): 100 TABLET, FILM COATED ORAL at 12:57

## 2019-12-22 NOTE — PROGRESS NOTE ADULT - ASSESSMENT
74yoF hx prior CVA with residual left sided weakness, HTN presenting with left sided facial droop and slurred speech, being admitted for CVA work up, left hip pain after fall xray ? fracture can not rule out     1- CVA acute subacute with old stroke   pt had MR of brain, TTE   cont aspirin ,statin   BP control  PT   SW referal for discharge planning   neurology consulted per ED/nocturnist  note     2- High BP - resume home medication    3- left leg hip pain s/p fall at home   xray not clear   d/w radiologist rec CT of pelvis ordered r/o occult fracture

## 2019-12-22 NOTE — PROGRESS NOTE ADULT - SUBJECTIVE AND OBJECTIVE BOX
Internal Medicine Hospitalist Progress Note    follow up for stroke , facial droop , HTN   pt is seen examined , sleeping lethargic   daughter at the bedside answer questions     pt had fall at home yesterday per daughter , was c/o left leg hip pain           ROS: as above, all remaining ROS are negative.       BACKGROUND:  MEDICATIONS  (STANDING):  aspirin  chewable 81 milliGRAM(s) Oral daily  atorvastatin 80 milliGRAM(s) Oral at bedtime  heparin  Injectable 5000 Unit(s) SubCutaneous every 8 hours  labetalol 50 milliGRAM(s) Oral two times a day  losartan 50 milliGRAM(s) Oral daily    MEDICATIONS  (PRN):  acetaminophen   Tablet .. 650 milliGRAM(s) Oral every 6 hours PRN Temp greater or equal to 38C (100.4F), Mild Pain (1 - 3), Moderate Pain (4 - 6)    Allergies    No Known Allergies    Intolerances            VITALS:  Vital Signs Last 24 Hrs  T(C): 36.7 (22 Dec 2019 13:28), Max: 37 (21 Dec 2019 23:44)  T(F): 98 (22 Dec 2019 13:28), Max: 98.6 (21 Dec 2019 23:44)  HR: 67 (22 Dec 2019 12:00) (60 - 85)  BP: 157/101 (22 Dec 2019 12:00) (127/89 - 164/99)  BP(mean): 101 (22 Dec 2019 04:00) (88 - 120)  RR: 18 (22 Dec 2019 12:00) (12 - 22)  SpO2: 100% (22 Dec 2019 12:00) (97% - 100%) Daily     Daily   CAPILLARY BLOOD GLUCOSE        I&O's Summary      PHYSICAL EXAM:      Constitutional: sleepy lethargic , moves right arm     Respiratory: CTA bilateral     Cardiovascular: regular s1 /s2     Gastrointestinal: soft , BS + no tenderness     Extremities: no pretibial edema     Neurological: lethargic , moves right side , left side weak   can not keep awake             LABS:                        12.8   4.66  )-----------( 276      ( 21 Dec 2019 13:53 )             40.7     12-22    140  |  101  |  11.0  ----------------------------<  92  3.7   |  24.0  |  0.79    Ca    10.8<H>      22 Dec 2019 07:49  Mg     2.6     12-21    TPro  8.6  /  Alb  4.6  /  TBili  0.8  /  DBili  x   /  AST  30  /  ALT  19  /  AlkPhos  117  12-21    PT/INR - ( 21 Dec 2019 13:53 )   PT: 12.4 sec;   INR: 1.08 ratio         PTT - ( 21 Dec 2019 13:53 )  PTT:27.5 sec    Radiology :

## 2019-12-22 NOTE — CONSULT NOTE ADULT - SUBJECTIVE AND OBJECTIVE BOX
Patient is a 74y old  Female who presents with a chief complaint of Facial droop, dysarthria (22 Dec 2019 14:16)      HPI:  74-yo female PMH CVA with residual left hemiparesis and HTN presented with facial droop and speech disturbance. Patient's family brought t patient to ER because of left facial droop and slurred speech. There was no headache, nausea, vomiting, dizziness. There was no seizure activity. Head CT revealed hypodensity in the right frontal lobe, no bleed. Patient is on ASA.       PAST MEDICAL & SURGICAL HISTORY:  Paralysis of left upper extremity  CVA (cerebral vascular accident)  No significant past surgical history      REVIEW OF SYSTEMS:  System review as documented in the record.       MEDICATIONS  (STANDING):  aspirin  chewable 81 milliGRAM(s) Oral daily  atorvastatin 80 milliGRAM(s) Oral at bedtime  heparin  Injectable 5000 Unit(s) SubCutaneous every 8 hours  labetalol 50 milliGRAM(s) Oral two times a day  losartan 50 milliGRAM(s) Oral daily    MEDICATIONS  (PRN):  acetaminophen   Tablet .. 650 milliGRAM(s) Oral every 6 hours PRN Temp greater or equal to 38C (100.4F), Mild Pain (1 - 3), Moderate Pain (4 - 6)      Allergies    No Known Allergies    Intolerances        SOCIAL HISTORY:    FAMILY HISTORY:  No pertinent family history in first degree relatives      PHYSICAL EXAM:  Vital Signs Last 24 Hrs  T(F): 98 (19 @ 13:28)  HR: 67 (19 @ 12:00)  BP: 157/101 (19 @ 12:00)  RR: 18 (19 @ 12:00)    GENERAL: No acute distress  NERVOUS SYSTEM:    Awake, speech limited, dysarthric, and patient follows commands.  Cranial nerves exam: II-XII normal except facial asymmetry. Tongue is in midline.  Motor exam: no drift in right UE; left hemiplegia with spasticity. Patient has good strength in the right upper and lower extremities  Sensory exam: limited, ? decreased LT in the left UE/LE  DTR: 2+ asymmetric, L>R, left Barbinski      LABS:                        12.8   4.66  )-----------( 276      ( 21 Dec 2019 13:53 )             40.7         140  |  101  |  11.0  ----------------------------<  92  3.7   |  24.0  |  0.79    Ca    10.8<H>      22 Dec 2019 07:49  Mg     2.6         TPro  8.6  /  Alb  4.6  /  TBili  0.8  /  DBili  x   /  AST  30  /  ALT  19  /  AlkPhos  117  12-    PT/INR - ( 21 Dec 2019 13:53 )   PT: 12.4 sec;   INR: 1.08 ratio         PTT - ( 21 Dec 2019 13:53 )  PTT:27.5 sec  Urinalysis Basic - ( 21 Dec 2019 16:21 )    Color: Yellow / Appearance: Slightly Turbid / S.010 / pH: x  Gluc: x / Ketone: Negative  / Bili: Negative / Urobili: Negative mg/dL   Blood: x / Protein: Negative mg/dL / Nitrite: Negative   Leuk Esterase: Negative / RBC: 0-2 /HPF / WBC 0-2   Sq Epi: x / Non Sq Epi: Few / Bacteria: x          RADIOLOGY & ADDITIONAL STUDIES:  Head CT and CT angiogram reported < from: CT Angio Neck w/ IV Cont (19 @ 17:58) >    IMPRESSION:     Noncontrast CT Head: Redemonstration of areas of hypodensity within the right frontal lobe, concerning for acute/subacute infarct. Chronic right temporal occipital infarct with possible superimposed age-indeterminate infarct. Stable left medial frontal lobe hypodensity. No acute intracranial hemorrhage or significant mass effect.    CT cervical spine: No acute cervical spinal fracture or evidence of traumatic malalignment. Numerous small rounded lucencies scattered throughout the visualized spine. Further evaluation with nonemergent MRI and/or bone scan is recommended as findings may be seen in the setting of underlying malignancy.    CTA Neck: No flow-limiting stenosis or occlusion within the cervical carotid or vertebral arteries.     CTA Head: Intracranial atherosclerosis contributing to severe stenosis of the right supraclinoid internal carotid artery. Additional areas of scattered intracranial luminal stenoses, including the mid basilar segment, which demonstrates mild luminal stenosis.      < end of copied text >      Brain MRI reported < from: MR Head No Cont (19 @ 10:30) >  Area of encephalomalacia and gliosis is seen involving the right posterior temporal/occipital region. This compatible with an old infarct. There are scattered areasof T2 pronation with restricted diffusion seen involving the right posterior temporal, superior frontal parietal and right centrum semiovale region. These findings are compatible with areas of acute to subacute infarcts. No hemorrhagic transformationis seen. No significant shift or herniation is seen.    The large vessels demonstrate normal flow voids    The visualized paranasal sinuses mastoid and middle ear regions appear clear.    Abnormal decreased signal seen involving the C3 vertebral body. This is seen posteriorly. Please note the possibility of an underlying neoplastic lesion such as metastasis myeloma or lymphoma cannot be entirely excluded. Dedicated imaging of the cervical spine can be done for further evaluation.    Impression:    Acute to subacute infarct as described above.    < end of copied text >      Carotid duplex reported < from: US Duplex Carotid Arteries Complete, Bilateral (12.22.19 @ 09:53) >    IMPRESSION:     1.  The ultrasound tech notes that the study is limited due to suboptimal patient cooperation. The study was also limited due to a high bifurcation of the bilateral common carotid arteries.  Please see recently performed CT angiogram of the neck from 2019.     2.  No hemodynamically significant stenosis is seen bilaterally.    Atherosclerotic burden, as above.       < end of copied text >        IMPRESSION:  Acute infarcts in the right temporal and parietal lobes  Chronic infarct in the right frontal lobe with left hemiplegia and spasticity  Right distal ICA stenosis (severe)  C3 abnormality, etiology?  HTN    PLAN:  - continue stroke protocol  - continue ASA and risk factor management  - recommend vascular / interventional radiology evaluation for right distal ICA stenosis  - cardiology evaluation, ?DANA  - medical evaluation for cervical C3 vertebrate lesion, ? cervical MRI  - neuro stable

## 2019-12-23 ENCOUNTER — TRANSCRIPTION ENCOUNTER (OUTPATIENT)
Age: 74
End: 2019-12-23

## 2019-12-23 DIAGNOSIS — I67.2 CEREBRAL ATHEROSCLEROSIS: ICD-10-CM

## 2019-12-23 DIAGNOSIS — M89.9 DISORDER OF BONE, UNSPECIFIED: ICD-10-CM

## 2019-12-23 DIAGNOSIS — M25.559 PAIN IN UNSPECIFIED HIP: ICD-10-CM

## 2019-12-23 DIAGNOSIS — Z51.5 ENCOUNTER FOR PALLIATIVE CARE: ICD-10-CM

## 2019-12-23 DIAGNOSIS — I63.9 CEREBRAL INFARCTION, UNSPECIFIED: ICD-10-CM

## 2019-12-23 PROBLEM — G83.24: Chronic | Status: ACTIVE | Noted: 2019-12-21

## 2019-12-23 LAB
ANION GAP SERPL CALC-SCNC: 13 MMOL/L — SIGNIFICANT CHANGE UP (ref 5–17)
APPEARANCE UR: CLEAR — SIGNIFICANT CHANGE UP
BILIRUB UR-MCNC: NEGATIVE — SIGNIFICANT CHANGE UP
BUN SERPL-MCNC: 15 MG/DL — SIGNIFICANT CHANGE UP (ref 8–20)
CALCIUM SERPL-MCNC: 10.2 MG/DL — SIGNIFICANT CHANGE UP (ref 8.6–10.2)
CHLORIDE SERPL-SCNC: 101 MMOL/L — SIGNIFICANT CHANGE UP (ref 98–107)
CK MB CFR SERPL CALC: 2.1 NG/ML — SIGNIFICANT CHANGE UP (ref 0–6.7)
CK SERPL-CCNC: 385 U/L — HIGH (ref 25–170)
CO2 SERPL-SCNC: 24 MMOL/L — SIGNIFICANT CHANGE UP (ref 22–29)
COLOR SPEC: YELLOW — SIGNIFICANT CHANGE UP
CREAT ?TM UR-MCNC: 74 MG/DL — SIGNIFICANT CHANGE UP
CREAT SERPL-MCNC: 0.65 MG/DL — SIGNIFICANT CHANGE UP (ref 0.5–1.3)
DIFF PNL FLD: NEGATIVE — SIGNIFICANT CHANGE UP
GLUCOSE SERPL-MCNC: 101 MG/DL — SIGNIFICANT CHANGE UP (ref 70–115)
GLUCOSE UR QL: NEGATIVE MG/DL — SIGNIFICANT CHANGE UP
HBA1C BLD-MCNC: 5.6 % — SIGNIFICANT CHANGE UP (ref 4–5.6)
HCT VFR BLD CALC: 39 % — SIGNIFICANT CHANGE UP (ref 34.5–45)
HGB BLD-MCNC: 12.3 G/DL — SIGNIFICANT CHANGE UP (ref 11.5–15.5)
KETONES UR-MCNC: NEGATIVE — SIGNIFICANT CHANGE UP
LEUKOCYTE ESTERASE UR-ACNC: NEGATIVE — SIGNIFICANT CHANGE UP
MCHC RBC-ENTMCNC: 27.7 PG — SIGNIFICANT CHANGE UP (ref 27–34)
MCHC RBC-ENTMCNC: 31.5 GM/DL — LOW (ref 32–36)
MCV RBC AUTO: 87.8 FL — SIGNIFICANT CHANGE UP (ref 80–100)
NITRITE UR-MCNC: NEGATIVE — SIGNIFICANT CHANGE UP
OSMOLALITY UR: 396 MOSM/KG — SIGNIFICANT CHANGE UP (ref 300–1000)
PH UR: 7 — SIGNIFICANT CHANGE UP (ref 5–8)
PLATELET # BLD AUTO: 242 K/UL — SIGNIFICANT CHANGE UP (ref 150–400)
POTASSIUM SERPL-MCNC: 4.2 MMOL/L — SIGNIFICANT CHANGE UP (ref 3.5–5.3)
POTASSIUM SERPL-SCNC: 4.2 MMOL/L — SIGNIFICANT CHANGE UP (ref 3.5–5.3)
PROT ?TM UR-MCNC: 9 MG/DL — SIGNIFICANT CHANGE UP (ref 0–12)
PROT UR-MCNC: NEGATIVE MG/DL — SIGNIFICANT CHANGE UP
RBC # BLD: 4.44 M/UL — SIGNIFICANT CHANGE UP (ref 3.8–5.2)
RBC # FLD: 12.9 % — SIGNIFICANT CHANGE UP (ref 10.3–14.5)
SODIUM SERPL-SCNC: 138 MMOL/L — SIGNIFICANT CHANGE UP (ref 135–145)
SODIUM UR-SCNC: 62 MMOL/L — SIGNIFICANT CHANGE UP
SP GR SPEC: 1.01 — SIGNIFICANT CHANGE UP (ref 1.01–1.02)
TROPONIN T SERPL-MCNC: <0.01 NG/ML — SIGNIFICANT CHANGE UP (ref 0–0.06)
TROPONIN T SERPL-MCNC: <0.01 NG/ML — SIGNIFICANT CHANGE UP (ref 0–0.06)
UROBILINOGEN FLD QL: NEGATIVE MG/DL — SIGNIFICANT CHANGE UP
WBC # BLD: 4.75 K/UL — SIGNIFICANT CHANGE UP (ref 3.8–10.5)
WBC # FLD AUTO: 4.75 K/UL — SIGNIFICANT CHANGE UP (ref 3.8–10.5)

## 2019-12-23 PROCEDURE — 99223 1ST HOSP IP/OBS HIGH 75: CPT

## 2019-12-23 PROCEDURE — 93010 ELECTROCARDIOGRAM REPORT: CPT

## 2019-12-23 PROCEDURE — 72192 CT PELVIS W/O DYE: CPT | Mod: 26

## 2019-12-23 PROCEDURE — 93308 TTE F-UP OR LMTD: CPT | Mod: 26

## 2019-12-23 PROCEDURE — 99233 SBSQ HOSP IP/OBS HIGH 50: CPT

## 2019-12-23 RX ORDER — NITROGLYCERIN 6.5 MG
2 CAPSULE, EXTENDED RELEASE ORAL ONCE
Refills: 0 | Status: COMPLETED | OUTPATIENT
Start: 2019-12-23 | End: 2019-12-23

## 2019-12-23 RX ORDER — SODIUM CHLORIDE 9 MG/ML
1000 INJECTION, SOLUTION INTRAVENOUS
Refills: 0 | Status: DISCONTINUED | OUTPATIENT
Start: 2019-12-23 | End: 2019-12-26

## 2019-12-23 RX ORDER — LOSARTAN POTASSIUM 100 MG/1
100 TABLET, FILM COATED ORAL DAILY
Refills: 0 | Status: DISCONTINUED | OUTPATIENT
Start: 2019-12-23 | End: 2019-12-27

## 2019-12-23 RX ORDER — HYDRALAZINE HCL 50 MG
10 TABLET ORAL ONCE
Refills: 0 | Status: COMPLETED | OUTPATIENT
Start: 2019-12-23 | End: 2019-12-23

## 2019-12-23 RX ORDER — MORPHINE SULFATE 50 MG/1
2 CAPSULE, EXTENDED RELEASE ORAL ONCE
Refills: 0 | Status: DISCONTINUED | OUTPATIENT
Start: 2019-12-23 | End: 2019-12-23

## 2019-12-23 RX ORDER — CLOPIDOGREL BISULFATE 75 MG/1
75 TABLET, FILM COATED ORAL DAILY
Refills: 0 | Status: DISCONTINUED | OUTPATIENT
Start: 2019-12-23 | End: 2019-12-27

## 2019-12-23 RX ORDER — LABETALOL HCL 100 MG
10 TABLET ORAL ONCE
Refills: 0 | Status: COMPLETED | OUTPATIENT
Start: 2019-12-23 | End: 2019-12-23

## 2019-12-23 RX ORDER — LABETALOL HCL 100 MG
100 TABLET ORAL
Refills: 0 | Status: DISCONTINUED | OUTPATIENT
Start: 2019-12-23 | End: 2019-12-23

## 2019-12-23 RX ORDER — AMLODIPINE BESYLATE 2.5 MG/1
5 TABLET ORAL ONCE
Refills: 0 | Status: DISCONTINUED | OUTPATIENT
Start: 2019-12-23 | End: 2019-12-23

## 2019-12-23 RX ORDER — AMLODIPINE BESYLATE 2.5 MG/1
5 TABLET ORAL DAILY
Refills: 0 | Status: DISCONTINUED | OUTPATIENT
Start: 2019-12-23 | End: 2019-12-24

## 2019-12-23 RX ORDER — LIDOCAINE 4 G/100G
1 CREAM TOPICAL DAILY
Refills: 0 | Status: DISCONTINUED | OUTPATIENT
Start: 2019-12-23 | End: 2019-12-27

## 2019-12-23 RX ADMIN — Medication 81 MILLIGRAM(S): at 13:30

## 2019-12-23 RX ADMIN — Medication 10 MILLIGRAM(S): at 13:29

## 2019-12-23 RX ADMIN — HEPARIN SODIUM 5000 UNIT(S): 5000 INJECTION INTRAVENOUS; SUBCUTANEOUS at 21:18

## 2019-12-23 RX ADMIN — HEPARIN SODIUM 5000 UNIT(S): 5000 INJECTION INTRAVENOUS; SUBCUTANEOUS at 14:17

## 2019-12-23 RX ADMIN — MORPHINE SULFATE 2 MILLIGRAM(S): 50 CAPSULE, EXTENDED RELEASE ORAL at 13:40

## 2019-12-23 RX ADMIN — ATORVASTATIN CALCIUM 80 MILLIGRAM(S): 80 TABLET, FILM COATED ORAL at 21:20

## 2019-12-23 RX ADMIN — Medication 10 MILLIGRAM(S): at 21:18

## 2019-12-23 RX ADMIN — Medication 10 MILLIGRAM(S): at 00:04

## 2019-12-23 RX ADMIN — SODIUM CHLORIDE 65 MILLILITER(S): 9 INJECTION, SOLUTION INTRAVENOUS at 16:57

## 2019-12-23 RX ADMIN — Medication 2 INCH(S): at 22:31

## 2019-12-23 RX ADMIN — MORPHINE SULFATE 2 MILLIGRAM(S): 50 CAPSULE, EXTENDED RELEASE ORAL at 13:29

## 2019-12-23 RX ADMIN — LOSARTAN POTASSIUM 50 MILLIGRAM(S): 100 TABLET, FILM COATED ORAL at 05:22

## 2019-12-23 RX ADMIN — Medication 50 MILLIGRAM(S): at 04:16

## 2019-12-23 RX ADMIN — AMLODIPINE BESYLATE 5 MILLIGRAM(S): 2.5 TABLET ORAL at 18:37

## 2019-12-23 RX ADMIN — HEPARIN SODIUM 5000 UNIT(S): 5000 INJECTION INTRAVENOUS; SUBCUTANEOUS at 05:22

## 2019-12-23 RX ADMIN — LOSARTAN POTASSIUM 100 MILLIGRAM(S): 100 TABLET, FILM COATED ORAL at 14:51

## 2019-12-23 RX ADMIN — CLOPIDOGREL BISULFATE 75 MILLIGRAM(S): 75 TABLET, FILM COATED ORAL at 18:37

## 2019-12-23 NOTE — DISCHARGE NOTE NURSING/CASE MANAGEMENT/SOCIAL WORK - [id number]_GEN_ALL_CORE
Stroke warning signs and symptoms/Signs and symptoms of stroke/Risk factors for stroke/Stroke support groups for patients, families, and friends/Call 911 for stroke/Need for follow up after discharge/Prescribed medications/Stroke education booklet

## 2019-12-23 NOTE — CONSULT NOTE ADULT - SUBJECTIVE AND OBJECTIVE BOX
[place] [facility]                                                               Office:  [address] [address]                                                              Telephone: [phone]. Fax:[phone]                                                                        CARDIOLOGY CONSULTATION NOTE                                                                                             Consult requested by:    Reason for Consultation:   History obtained by: Patient and medical record   obtained: No    Chief complaint:    Patient is a 74y old  Female who presents with a chief complaint of Facial droop, dysarthria (23 Dec 2019 12:54)        HPI: Pt is a 75 y/o female with medical history of HTN, prior CVA who presents to [facility] to left sided facial droop and slurred speech.   74yoF hx prior CVA with residual left sided weakness, HTN presenting with left sided facial droop and slurred speech.  Pt Martiniquais Cre[place] speaking, knows limited English, so hx obtained from chart review and verbal sign out from tele-hospitalist who spoke with pt daughter Faye and pt grandson Mark.  Only granddaughter Ketsia currently at bedside and can provide limited hx.  Pt has baseline left sided weakness from recent CVA she had 1 month ago (granddaughter reports she was at [facility] during CVA, however no record in EMR.  Pt unable to move her left arm due to contracture after stroke and ambulates with a walker.  Earlier this AM, they noticed that she had a left sided facial droop and her speech was not clear.  Per granddaughter at bedside, pt still having slurred speech when she talks.  Pt also had recent fall with impact to her left side and currently endorses left hip pain to me.     On admission, CT head shows re-demonstration of areas of hypodensity within the right frontal lobe, concerning for acute/subacute infarct, chronic right temporal occipital infarct with possible superimposed age-indeterminate infarct and stable left medial frontal lobe hypodensity.  CT cervical spine showed numerous round luncencies with follow up non-emergent MRI or bone scan recommended. (21 Dec 2019 23:33)        REVIEW OF SYMPTOMS:     CONSTITUTIONAL: No fever, weight loss, or fatigue  ENMT:  No difficulty hearing, tinnitus, vertigo; No sinus or throat pain  NECK: No pain or stiffness  CARDIOVASCULAR: No chest pain, dyspnea, syncope, palpitations, dizziness, Orthopnea, Paroxsymal nocturnal dyspnea  RESPIRATORY: No Dyspnea on exertion, Shortness of breath, cough, wheezing  : No dysuria, no hematuria   GI: No dark color stool, no melena, no diarrhea, no constipation, no abdominal pain   NEURO: No headache, no dizziness, no slurred speech   MUSCULOSKELETAL: No joint pain or swelling; No muscle, back, or extremity pain  PSYCH: No agitation, no anxiety.    ALL OTHER REVIEW OF SYSTEMS ARE NEGATIVE.      PREVIOUS DIAGNOSTIC TESTING  ECHO FINDINGS:      STRESS FINDINGS:      CATHETERIZATION FINDINGS:         ALLERGIES: Allergies    No Known Allergies    Intolerances          PAST MEDICAL HISTORY  Paralysis of left upper extremity  CVA (cerebral vascular accident)      PAST SURGICAL HISTORY  No significant past surgical history      FAMILY HISTORY:  No pertinent family history in first degree relatives      SOCIAL HISTORY:  Denies smoking/alcohol/drugs  CIGARETTES:     ALCOHOL:  DRUGS:      CURRENT MEDICATIONS:  hydrALAZINE Injectable 10 milliGRAM(s) IV Push once  labetalol 100 milliGRAM(s) Oral two times a day  losartan 100 milliGRAM(s) Oral daily     morphine  - Injectable  aspirin  chewable  atorvastatin  heparin  Injectable        HOME MEDICATIONS:      Vital Signs Last 24 Hrs  T(C): 36.7 (23 Dec 2019 08:30), Max: 37.4 (22 Dec 2019 20:44)  T(F): 98.1 (23 Dec 2019 08:30), Max: 99.4 (22 Dec 2019 20:44)  HR: 68 (23 Dec 2019 12:00) (63 - 99)  BP: 147/116 (23 Dec 2019 12:00) (147/116 - 172/118)  BP(mean): --  RR: 16 (23 Dec 2019 12:00) (14 - 19)  SpO2: 100% (23 Dec 2019 12:00) (95% - 100%)      PHYSICAL EXAM:  Constitutional: Comfortable . No acute distress.   HEENT: Atraumatic and normocephalic , neck is supple . no JVD. No carotid bruit. PEERL   CNS: A&Ox3. No focal deficits. EOMI. Cranial nerves II-IX are intact.   Lymph Nodes: Cervical : Not palpable.  Respiratory: CTAB  Cardiovascular: S1S2 RRR. No murmur/rubs or gallop.  Gastrointestinal: Soft non-tender and non distended . +Bowel sounds. negative Patino's sign.  Extremities: No edema.   Psychiatric: Calm . no agitation.  Skin: No skin rash/ulcers visualized to face, hands or feet.    Intake and output:   - @ 07:01  -  - @ 07:00  --------------------------------------------------------  IN: 780 mL / OUT: 1350 mL / NET: -570 mL     @ 07:01  -   @ 12:59  --------------------------------------------------------  IN: 340 mL / OUT: 0 mL / NET: 340 mL        LABS:                        12.8   4.66  )-----------( 276      ( 21 Dec 2019 13:53 )             40.7         140  |  101  |  11.0  ----------------------------<  92  3.7   |  24.0  |  0.79    Ca    10.8<H>      22 Dec 2019 07:49  Mg     2.6         TPro  8.6  /  Alb  4.6  /  TBili  0.8  /  DBili  x   /  AST  30  /  ALT  19  /  AlkPhos  117  12-    CARDIAC MARKERS ( 21 Dec 2019 13:53 )  x     / <0.01 ng/mL / x     / x     / x        ;p-BNP=  PT/INR - ( 21 Dec 2019 13:53 )   PT: 12.4 sec;   INR: 1.08 ratio         PTT - ( 21 Dec 2019 13:53 )  PTT:27.5 sec  Urinalysis Basic - ( 21 Dec 2019 16:21 )    Color: Yellow / Appearance: Slightly Turbid / S.010 / pH: x  Gluc: x / Ketone: Negative  / Bili: Negative / Urobili: Negative mg/dL   Blood: x / Protein: Negative mg/dL / Nitrite: Negative   Leuk Esterase: Negative / RBC: 0-2 /HPF / WBC 0-2   Sq Epi: x / Non Sq Epi: Few / Bacteria: x        INTERPRETATION OF TELEMETRY: Reviewed by me.   ECG: Reviewed by me.     RADIOLOGY & ADDITIONAL STUDIES:    X-ray:  reviewed by me.   CT scan:   MRI: [place] [facility]                                                               Office:  [address] [address]                                                              Telephone: [phone]. Fax:[phone]                                                                        CARDIOLOGY CONSULTATION NOTE                                                                                             Consult requested by: DONITA Pennington  Reason for Consultation: CVA  History obtained by: Patient and medical record   obtained: Yes- Amy [id number]    Chief complaint:    Patient is a 74y old  Female who presents with a chief complaint of Facial droop, dysarthria (23 Dec 2019 12:54)        HPI: Pt is a 75 y/o female with medical history of HTN, prior CVA who presents to [facility] to left sided facial droop and slurred speech. Pt states with assistance of , she fell at home 2 days ago. When her daughter saw he she noticed he face was distorted so she was brought to the hospital. Upon admission, pt was found to have acute to subacute infarct, and intracranial stenosis of right internal carotid artery on MRI and CT imaging. Pt had DANA completed which showed EF 55-60% EF Grade I diastolic dysfunction.   Pt currently presents with significant left sided weakness. Upon assessment, pt also c/o of sternal throbbing chest pain, raidiating to back, 10/10. Pt states "she feels like her heart is not beating". Pain is not reproducible upon palpation. Pt Denies fever, chills, cough, phlegm production, shortness of breath, dyspnea on exertion, orthopnea, PND, edema, palpitations, irregular, fast heart beat, nausea, vomiting, melena, rectal bleed, hematuria, lightheadedness, dizziness, syncope, near syncope.     REVIEW OF SYMPTOMS:     CONSTITUTIONAL: No fever, weight loss, or fatigue  ENMT:  No difficulty hearing, tinnitus, vertigo; No sinus or throat pain  NECK: No pain or stiffness  CARDIOVASCULAR: See HPI  RESPIRATORY: No Dyspnea on exertion, Shortness of breath, cough, wheezing  : No dysuria, no hematuria   GI: No dark color stool, no melena, no diarrhea, no constipation, no abdominal pain   NEURO: No headache, no dizziness, no slurred speech   MUSCULOSKELETAL: No joint pain or swelling; No muscle, back, or extremity pain  PSYCH: No agitation, no anxiety.    ALL OTHER REVIEW OF SYSTEMS ARE NEGATIVE.      PREVIOUS DIAGNOSTIC TESTING  ECHO FINDINGS:< from: TTE Echo Complete w/Doppler (19 @ 10:37) >  Summary:   1. Left ventricular ejection fraction, by visual estimation, is 55 to 60%.   2. Technically fair study.   3. Spectral Doppler shows impaired relaxation pattern of left ventricular myocardial filling (Grade I diastolic dysfunction).   4. Sclerotic aortic valve with normal opening.    < end of copied text >        ALLERGIES: Allergies    No Known Allergies    Intolerances    PAST MEDICAL HISTORY  Paralysis of left upper extremity  CVA (cerebral vascular accident)      PAST SURGICAL HISTORY  No significant past surgical history      FAMILY HISTORY:  No pertinent family history in first degree relatives      CURRENT MEDICATIONS:  hydrALAZINE Injectable 10 milliGRAM(s) IV Push once  labetalol 100 milliGRAM(s) Oral two times a day  losartan 100 milliGRAM(s) Oral daily  morphine  - Injectable  aspirin  chewable  atorvastatin  heparin  Injectable        HOME MEDICATIONS:    Aspir 81 oral delayed release tablet: 1 tab(s) orally once a day (21 Dec 2019 22:13)  losartan 100 mg oral tablet: 1 tab(s) orally once a day (21 Dec 2019 22:13)      Vital Signs Last 24 Hrs  T(C): 36.7 (23 Dec 2019 08:30), Max: 37.4 (22 Dec 2019 20:44)  T(F): 98.1 (23 Dec 2019 08:30), Max: 99.4 (22 Dec 2019 20:44)  HR: 68 (23 Dec 2019 12:00) (63 - 99)  BP: 147/116 (23 Dec 2019 12:00) (147/116 - 172/118)  RR: 16 (23 Dec 2019 12:00) (14 - 19)  SpO2: 100% (23 Dec 2019 12:00) (95% - 100%)      PHYSICAL EXAM:  Constitutional: Comfortable . No acute distress.   HEENT: Atraumatic and normocephalic , neck is supple . no JVD. No carotid bruit. PEERL   CNS: A&Ox3. No focal deficits. EOMI. Cranial nerves II-IX are intact.   Lymph Nodes: Cervical : Not palpable.  Respiratory: CTAB  Cardiovascular: S1S2 RRR. No murmur/rubs or gallop.  Gastrointestinal: Soft non-tender and non distended . +Bowel sounds. negative Patino's sign.  Extremities: No edema.   Psychiatric: Calm . no agitation.  Skin: No skin rash/ulcers visualized to face, hands or feet.    Intake and output:    @ 07:01  -  12-23 @ 07:00  --------------------------------------------------------  IN: 780 mL / OUT: 1350 mL / NET: -570 mL     @ 07: @ 12:59  --------------------------------------------------------  IN: 340 mL / OUT: 0 mL / NET: 340 mL        LABS:                        12.8   4.66  )-----------( 276      ( 21 Dec 2019 13:53 )             40.7         140  |  101  |  11.0  ----------------------------<  92  3.7   |  24.0  |  0.79    Ca    10.8<H>      22 Dec 2019 07:49  Mg     2.6         TPro  8.6  /  Alb  4.6  /  TBili  0.8  /  DBili  x   /  AST  30  /  ALT  19  /  AlkPhos  117  12-    CARDIAC MARKERS ( 21 Dec 2019 13:53 )  x     / <0.01 ng/mL / x     / x     / x        ;p-BNP=  PT/INR - ( 21 Dec 2019 13:53 )   PT: 12.4 sec;   INR: 1.08 ratio         PTT - ( 21 Dec 2019 13:53 )  PTT:27.5 sec  Urinalysis Basic - ( 21 Dec 2019 16:21 )    Color: Yellow / Appearance: Slightly Turbid / S.010 / pH: x  Gluc: x / Ketone: Negative  / Bili: Negative / Urobili: Negative mg/dL   Blood: x / Protein: Negative mg/dL / Nitrite: Negative   Leuk Esterase: Negative / RBC: 0-2 /HPF / WBC 0-2   Sq Epi: x / Non Sq Epi: Few / Bacteria: x        INTERPRETATION OF TELEMETRY: Reviewed by me.   EC19- NSR HR @ 83     RADIOLOGY & ADDITIONAL STUDIES:    X-ray:  < from: Xray Chest 1 View- PORTABLE-Urgent (19 @ 14:08) >  NTERPRETATION:  Portable chest radiograph        CLINICAL INFORMATION: CVA. Chest x-ray ordered as part of standard stroke protocol /altered mental status workup.      TECHNIQUE:  Portable  AP view of the chest was obtained.    COMPARISON: 2019 available for review.    FINDINGS:    The lungs  are clear.  No pleural abnormality.    The  heart is enlarged in transverse diameter. No hilar mass. Trachea midline.   Visualized osseous structures are intact.        IMPRESSION:   No evidence of active chest disease.            < end of copied text >  < from: Xray Hip w/ Pelvis 2 or 3 Views, Left (19 @ 06:24) >  INTERPRETATION:  Exam Date: 2019 6:24 AM  Clinical Information: Left hip pain status post fall  Technique: Single view of the pelvis and 3 views ofthe left hip     Findings:    Minimal degenerative changes. No definite pelvic fracture. Irregularity along the superior aspect of the subcapital region of the left femoral neck may be secondary positioning however CT is recommended to exclude fracture.    Impression:     Irregularity along the superior aspect of the subcapital region of the left femoral neck may be secondary positioning however CT is recommended to exclude fracture     < end of copied text >    CT scan: < from: CT Angio Neck w/ IV Cont (19 @ 17:58) >  IMPRESSION:     Noncontrast CT Head: Redemonstration of areas of hypodensity within the right frontal lobe, concerning for acute/subacute infarct. Chronic right temporal occipital infarct with possible superimposed age-indeterminate infarct. Stable left medial frontal lobe hypodensity. No acute intracranial hemorrhage or significant mass effect.    CT cervical spine: No acute cervical spinal fracture or evidence of traumatic malalignment. Numerous small rounded lucencies scattered throughout the visualized spine. Further evaluation with nonemergent MRI and/or bone scan is recommended as findings may be seen in the setting of underlying malignancy.    CTA Neck: No flow-limiting stenosis or occlusion within the cervical carotid or vertebral arteries.     CTA Head: Intracranial atherosclerosis contributing to severe stenosis of the right supraclinoid internal carotid artery. Additional areas of scattered intracranial luminal stenoses, including the mid basilar segment, which demonstrates mild luminal stenosis.      < end of copied text >    MRI: < from: MR Head No Cont (12.22.19 @ 10:30) >  INTERPRETATION:  Clinical indication: Facial droop.    MRI of the brain was performed using sagittal T1, axial T1 T2 T2 FLAIR diffusion and gradient echo sequence.     This exam is compared with prior noncontrast head CT performed on 2019.    Area of encephalomalacia and gliosis is seen involving the right posterior temporal/occipital region. This compatible with an old infarct. There are scattered areasof T2 pronation with restricted diffusion seen involving the right posterior temporal, superior frontal parietal and right centrum semiovale region. These findings are compatible with areas of acute to subacute infarcts. No hemorrhagic transformationis seen. No significant shift or herniation is seen.    The large vessels demonstrate normal flow voids    The visualized paranasal sinuses mastoid and middle ear regions appear clear.    Abnormal decreased signal seen involving the C3 vertebral body. This is seen posteriorly. Please note the possibility of an underlying neoplastic lesion such as metastasis myeloma or lymphoma cannot be entirely excluded. Dedicated imaging of the cervical spine can be done for further evaluation.    Impression:    Acute to subacute infarct as described above.      < end of copied text > [place] [facility]                                                               Office:  [address] [address]                                                              Telephone: [phone]. Fax:[phone]                                                                        CARDIOLOGY CONSULTATION NOTE                                                                                             Consult requested by: DONITA Pennington  Reason for Consultation: CVA  History obtained by: Patient and medical record   obtained: Yes- Amy [id number]    Chief complaint:    Patient is a 74y old  Female who presents with a chief complaint of Facial droop, dysarthria (23 Dec 2019 12:54)        HPI: Pt is a 73 y/o female with medical history of HTN, prior CVA who presents to [facility] to left sided facial droop and slurred speech. Pt states with assistance of , she fell at home 2 days ago. When her daughter saw he she noticed he face was distorted so she was brought to the hospital. Upon admission, pt was found to have acute to subacute infarct, and intracranial stenosis of right internal carotid artery on MRI and CT imaging. Pt had DANA completed which showed EF 55-60% EF Grade I diastolic dysfunction.   Pt currently presents with significant left sided weakness. Upon assessment, pt also c/o of sternal throbbing chest pain, raidiating to back, 10/10. Pt states "she feels like her heart is not beating". Pain is not reproducible upon palpation. Pt Denies fever, chills, cough, phlegm production, shortness of breath, dyspnea on exertion, orthopnea, PND, edema, palpitations, irregular, fast heart beat, nausea, vomiting, melena, rectal bleed, hematuria, lightheadedness, dizziness, syncope, near syncope.     REVIEW OF SYMPTOMS:     CONSTITUTIONAL: No fever, weight loss, or fatigue  ENMT:  No difficulty hearing, tinnitus, vertigo; No sinus or throat pain  NECK: No pain or stiffness  CARDIOVASCULAR: See HPI  RESPIRATORY: No Dyspnea on exertion, Shortness of breath, cough, wheezing  : No dysuria, no hematuria   GI: No dark color stool, no melena, no diarrhea, no constipation, no abdominal pain   NEURO: No headache, no dizziness, no slurred speech   MUSCULOSKELETAL: No joint pain or swelling; No muscle, back, or extremity pain  PSYCH: No agitation, no anxiety.    ALL OTHER REVIEW OF SYSTEMS ARE NEGATIVE.      PREVIOUS DIAGNOSTIC TESTING  ECHO FINDINGS:< from: TTE Echo Complete w/Doppler (19 @ 10:37) >  Summary:   1. Left ventricular ejection fraction, by visual estimation, is 55 to 60%.   2. Technically fair study.   3. Spectral Doppler shows impaired relaxation pattern of left ventricular myocardial filling (Grade I diastolic dysfunction).   4. Sclerotic aortic valve with normal opening.    < end of copied text >        ALLERGIES: Allergies    No Known Allergies    Intolerances    PAST MEDICAL HISTORY  Paralysis of left upper extremity  CVA (cerebral vascular accident)      PAST SURGICAL HISTORY  No significant past surgical history      FAMILY HISTORY:  No pertinent family history in first degree relatives      CURRENT MEDICATIONS:  hydrALAZINE Injectable 10 milliGRAM(s) IV Push once  labetalol 100 milliGRAM(s) Oral two times a day  losartan 100 milliGRAM(s) Oral daily  morphine  - Injectable  aspirin  chewable  atorvastatin  heparin  Injectable        HOME MEDICATIONS:    Aspir 81 oral delayed release tablet: 1 tab(s) orally once a day (21 Dec 2019 22:13)  losartan 100 mg oral tablet: 1 tab(s) orally once a day (21 Dec 2019 22:13)      Vital Signs Last 24 Hrs  T(C): 36.7 (23 Dec 2019 08:30), Max: 37.4 (22 Dec 2019 20:44)  T(F): 98.1 (23 Dec 2019 08:30), Max: 99.4 (22 Dec 2019 20:44)  HR: 68 (23 Dec 2019 12:00) (63 - 99)  BP: 147/116 (23 Dec 2019 12:00) (147/116 - 172/118)  RR: 16 (23 Dec 2019 12:00) (14 - 19)  SpO2: 100% (23 Dec 2019 12:00) (95% - 100%)      PHYSICAL EXAM:  Constitutional: Comfortable . No acute distress.   HEENT: Atraumatic and normocephalic , neck is supple . no JVD. No carotid bruit. PEERL   CNS: A&Ox3. No focal deficits. EOMI.    Lymph Nodes: Cervical : Not palpable.  Respiratory: CTAB  Cardiovascular: S1S2 RRR. No murmur/rubs or gallop.  Gastrointestinal: Soft non-tender and non distended . +Bowel sounds. negative Patino's sign.  Extremities: No edema, left sided weakness.   Psychiatric: Calm . no agitation.  Skin: No skin rash/ulcers visualized to face, hands or feet.    Intake and output:    @ 07: @ 07:00  --------------------------------------------------------  IN: 780 mL / OUT: 1350 mL / NET: -570 mL     @ 07: @ 12:59  --------------------------------------------------------  IN: 340 mL / OUT: 0 mL / NET: 340 mL        LABS:                        12.8   4.66  )-----------( 276      ( 21 Dec 2019 13:53 )             40.7         140  |  101  |  11.0  ----------------------------<  92  3.7   |  24.0  |  0.79    Ca    10.8<H>      22 Dec 2019 07:49  Mg     2.6         TPro  8.6  /  Alb  4.6  /  TBili  0.8  /  DBili  x   /  AST  30  /  ALT  19  /  AlkPhos  117  12-    CARDIAC MARKERS ( 21 Dec 2019 13:53 )  x     / <0.01 ng/mL / x     / x     / x        ;p-BNP=  PT/INR - ( 21 Dec 2019 13:53 )   PT: 12.4 sec;   INR: 1.08 ratio         PTT - ( 21 Dec 2019 13:53 )  PTT:27.5 sec  Urinalysis Basic - ( 21 Dec 2019 16:21 )    Color: Yellow / Appearance: Slightly Turbid / S.010 / pH: x  Gluc: x / Ketone: Negative  / Bili: Negative / Urobili: Negative mg/dL   Blood: x / Protein: Negative mg/dL / Nitrite: Negative   Leuk Esterase: Negative / RBC: 0-2 /HPF / WBC 0-2   Sq Epi: x / Non Sq Epi: Few / Bacteria: x        INTERPRETATION OF TELEMETRY: Reviewed by me.   EC19- NSR HR @ 83     RADIOLOGY & ADDITIONAL STUDIES:    X-ray:  < from: Xray Chest 1 View- PORTABLE-Urgent (19 @ 14:08) >  NTERPRETATION:  Portable chest radiograph        CLINICAL INFORMATION: CVA. Chest x-ray ordered as part of standard stroke protocol /altered mental status workup.      TECHNIQUE:  Portable  AP view of the chest was obtained.    COMPARISON: 2019 available for review.    FINDINGS:    The lungs  are clear.  No pleural abnormality.    The  heart is enlarged in transverse diameter. No hilar mass. Trachea midline.   Visualized osseous structures are intact.        IMPRESSION:   No evidence of active chest disease.            < end of copied text >  < from: Xray Hip w/ Pelvis 2 or 3 Views, Left (19 @ 06:24) >  INTERPRETATION:  Exam Date: 2019 6:24 AM  Clinical Information: Left hip pain status post fall  Technique: Single view of the pelvis and 3 views ofthe left hip     Findings:    Minimal degenerative changes. No definite pelvic fracture. Irregularity along the superior aspect of the subcapital region of the left femoral neck may be secondary positioning however CT is recommended to exclude fracture.    Impression:     Irregularity along the superior aspect of the subcapital region of the left femoral neck may be secondary positioning however CT is recommended to exclude fracture     < end of copied text >    CT scan: < from: CT Angio Neck w/ IV Cont (19 @ 17:58) >  IMPRESSION:     Noncontrast CT Head: Redemonstration of areas of hypodensity within the right frontal lobe, concerning for acute/subacute infarct. Chronic right temporal occipital infarct with possible superimposed age-indeterminate infarct. Stable left medial frontal lobe hypodensity. No acute intracranial hemorrhage or significant mass effect.    CT cervical spine: No acute cervical spinal fracture or evidence of traumatic malalignment. Numerous small rounded lucencies scattered throughout the visualized spine. Further evaluation with nonemergent MRI and/or bone scan is recommended as findings may be seen in the setting of underlying malignancy.    CTA Neck: No flow-limiting stenosis or occlusion within the cervical carotid or vertebral arteries.     CTA Head: Intracranial atherosclerosis contributing to severe stenosis of the right supraclinoid internal carotid artery. Additional areas of scattered intracranial luminal stenoses, including the mid basilar segment, which demonstrates mild luminal stenosis.      < end of copied text >    MRI: < from: MR Head No Cont (19 @ 10:30) >  INTERPRETATION:  Clinical indication: Facial droop.    MRI of the brain was performed using sagittal T1, axial T1 T2 T2 FLAIR diffusion and gradient echo sequence.     This exam is compared with prior noncontrast head CT performed on 2019.    Area of encephalomalacia and gliosis is seen involving the right posterior temporal/occipital region. This compatible with an old infarct. There are scattered areasof T2 pronation with restricted diffusion seen involving the right posterior temporal, superior frontal parietal and right centrum semiovale region. These findings are compatible with areas of acute to subacute infarcts. No hemorrhagic transformationis seen. No significant shift or herniation is seen.    The large vessels demonstrate normal flow voids    The visualized paranasal sinuses mastoid and middle ear regions appear clear.    Abnormal decreased signal seen involving the C3 vertebral body. This is seen posteriorly. Please note the possibility of an underlying neoplastic lesion such as metastasis myeloma or lymphoma cannot be entirely excluded. Dedicated imaging of the cervical spine can be done for further evaluation.    Impression:    Acute to subacute infarct as described above.      < end of copied text >

## 2019-12-23 NOTE — CONSULT NOTE ADULT - PROBLEM SELECTOR RECOMMENDATION 4
Work up in progress.   Plan to reach out to daughter to discuss further GOC. Met with patient. Limited in discussion   Work up in progress.   Plan to reach out to daughter to discuss further  history and GOC.

## 2019-12-23 NOTE — DISCHARGE NOTE NURSING/CASE MANAGEMENT/SOCIAL WORK - PATIENT PORTAL LINK FT
You can access the FollowMyHealth Patient Portal offered by [facility] by registering at the following website: [email or web address]. By joining [facility]’s FollowMyHealth portal, you will also be able to view your health information using other applications (apps) compatible with our system.

## 2019-12-23 NOTE — CONSULT NOTE ADULT - SUBJECTIVE AND OBJECTIVE BOX
[facility] DIVISION OF KIDNEY DISEASES AND HYPERTENSION -- INITIAL CONSULT NOTE  --------------------------------------------------------------------------------  HPI:    74 year old woman with history of  prior CVA with residual left sided weakness, HTN presenting with left sided facial droop and slurred speech; admitted for CVA work up. Nephrology consulted for hypercalcemia.    PAST HISTORY  --------------------------------------------------------------------------------  PAST MEDICAL & SURGICAL HISTORY:  Paralysis of left upper extremity  CVA (cerebral vascular accident)  No significant past surgical history    FAMILY HISTORY:  No pertinent family history in first degree relatives    PAST SOCIAL HISTORY:    ALLERGIES & MEDICATIONS  --------------------------------------------------------------------------------  Allergies    No Known Allergies    Intolerances      Standing Inpatient Medications  aspirin  chewable 81 milliGRAM(s) Oral daily  atorvastatin 80 milliGRAM(s) Oral at bedtime  heparin  Injectable 5000 Unit(s) SubCutaneous every 8 hours  labetalol 100 milliGRAM(s) Oral two times a day  losartan 100 milliGRAM(s) Oral daily    PRN Inpatient Medications  acetaminophen   Tablet .. 650 milliGRAM(s) Oral every 6 hours PRN      REVIEW OF SYSTEMS  --------------------------------------------------------------------------------  Gen: No weight changes, fatigue, fevers/chills, weakness  Skin: No rashes  Head/Eyes/Ears/Mouth: No headache; Normal hearing; Normal vision w/o blurriness; No sinus pain/discomfort, sore throat  Respiratory: No dyspnea, cough, wheezing, hemoptysis  CV: No chest pain, PND, orthopnea  GI: No abdominal pain, diarrhea, constipation, nausea, vomiting, melena, hematochezia  : No increased frequency, dysuria, hematuria, nocturia  MSK: No joint pain/swelling; no back pain; no edema  Neuro: No dizziness/lightheadedness, weakness, seizures, numbness, tingling  Heme: No easy bruising or bleeding  Endo: No heat/cold intolerance  Psych: No significant nervousness, anxiety, stress, depression    All other systems were reviewed and are negative, except as noted.    VITALS/PHYSICAL EXAM  --------------------------------------------------------------------------------  T(C): 36.7 (12-23-19 @ 08:30), Max: 37.4 (12-22-19 @ 20:44)  HR: 63 (12-23-19 @ 08:00) (63 - 99)  BP: 156/101 (12-23-19 @ 08:00) (147/119 - 172/118)  RR: 16 (12-23-19 @ 08:00) (14 - 19)  SpO2: 95% (12-23-19 @ 08:00) (95% - 100%)  Wt(kg): --        12-22-19 @ 07:01  -  12-23-19 @ 07:00  --------------------------------------------------------  IN: 780 mL / OUT: 1350 mL / NET: -570 mL    12-23-19 @ 07:01  -  12-23-19 @ 11:23  --------------------------------------------------------  IN: 340 mL / OUT: 0 mL / NET: 340 mL      Physical Exam:  	Gen: NAD, well-appearing  	HEENT: PERRL, supple neck, clear oropharynx  	Pulm: CTA B/L  	CV: RRR, S1S2; no rub  	Back: No spinal or CVA tenderness; no sacral edema  	Abd: +BS, soft, nontender/nondistended  	: No suprapubic tenderness  	UE: Warm, FROM, no clubbing, intact strength; no edema; no asterixis  	LE: Warm, FROM, no clubbing, intact strength; no edema  	Neuro: No focal deficits, intact gait  	Psych: Normal affect and mood  	Skin: Warm, without rashes  	Vascular access:    LABS/STUDIES  --------------------------------------------------------------------------------              12.8   4.66  >-----------<  276      [12-21-19 @ 13:53]              40.7     140  |  101  |  11.0  ----------------------------<  92      [12-22-19 @ 07:49]  3.7   |  24.0  |  0.79        Ca     10.8     [12-22-19 @ 07:49]      Mg     2.6     [12-21-19 @ 13:53]    TPro  8.6  /  Alb  4.6  /  TBili  0.8  /  DBili  x   /  AST  30  /  ALT  19  /  AlkPhos  117  [12-21-19 @ 13:53]    PT/INR: PT 12.4 , INR 1.08       [12-21-19 @ 13:53]  PTT: 27.5       [12-21-19 @ 13:53]    Troponin <0.01      [12-21-19 @ 13:53]    Creatinine Trend:  SCr 0.79 [12-22 @ 07:49]  SCr 0.79 [12-21 @ 13:53]    Urinalysis - [12-21-19 @ 16:21]      Color Yellow / Appearance Slightly Turbid / SG 1.010 / pH 8.0      Gluc Negative / Ketone Negative  / Bili Negative / Urobili Negative       Blood Negative / Protein Negative / Leuk Est Negative / Nitrite Negative      RBC 0-2 / WBC 0-2 / Hyaline  / Gran  / Sq Epi  / Non Sq Epi Few / Bacteria       HbA1c 5.6      [12-23-19 @ 09:18]  Lipid: chol 207, , HDL 50,       [12-22-19 @ 07:49]    HCV 0.08, Nonreact      [12-22-19 @ 16:48] [facility] DIVISION OF KIDNEY DISEASES AND HYPERTENSION -- INITIAL CONSULT NOTE  --------------------------------------------------------------------------------  HPI:    74 year old woman with history of  prior CVA with residual left sided weakness, HTN presenting with left sided facial droop and slurred speech; admitted for CVA work up. Nephrology consulted for hypercalcemia. Pt seen and examined; feels well. No acute complaint. Meds reviewed; pt now on Vitamin D analogues or thiazide diuretics.    PAST HISTORY  --------------------------------------------------------------------------------  PAST MEDICAL & SURGICAL HISTORY:  Paralysis of left upper extremity  CVA (cerebral vascular accident)  No significant past surgical history    FAMILY HISTORY:  No pertinent family history in first degree relatives    PAST SOCIAL HISTORY:    ALLERGIES & MEDICATIONS  --------------------------------------------------------------------------------  Allergies    No Known Allergies    Intolerances      Standing Inpatient Medications  aspirin  chewable 81 milliGRAM(s) Oral daily  atorvastatin 80 milliGRAM(s) Oral at bedtime  heparin  Injectable 5000 Unit(s) SubCutaneous every 8 hours  labetalol 100 milliGRAM(s) Oral two times a day  losartan 100 milliGRAM(s) Oral daily    PRN Inpatient Medications  acetaminophen   Tablet .. 650 milliGRAM(s) Oral every 6 hours PRN      REVIEW OF SYSTEMS  --------------------------------------------------------------------------------  Gen: No weight changes, fatigue, fevers/chills, weakness  Skin: No rashes  Head/Eyes/Ears/Mouth: No headache; Normal hearing; Normal vision w/o blurriness  Respiratory: No dyspnea, cough, wheezing, hemoptysis  CV: No chest pain, PND, orthopnea  GI: No abdominal pain, diarrhea, constipation, nausea, vomiting, melena,   : No increased frequency, dysuria, hematuria, nocturia  MSK: No joint pain/swelling; no back pain; no edema  Neuro: No dizziness/lightheadedness, weakness, seizures  Heme: No easy bruising or bleeding  Endo: No heat/cold intolerance  Psych: No significantanxiety, stress, depression    All other systems were reviewed and are negative, except as noted.    VITALS/PHYSICAL EXAM  --------------------------------------------------------------------------------  T(C): 36.7 (12-23-19 @ 08:30), Max: 37.4 (12-22-19 @ 20:44)  HR: 63 (12-23-19 @ 08:00) (63 - 99)  BP: 156/101 (12-23-19 @ 08:00) (147/119 - 172/118)  RR: 16 (12-23-19 @ 08:00) (14 - 19)  SpO2: 95% (12-23-19 @ 08:00) (95% - 100%)  Wt(kg): --        12-22-19 @ 07:01  -  12-23-19 @ 07:00  --------------------------------------------------------  IN: 780 mL / OUT: 1350 mL / NET: -570 mL    12-23-19 @ 07:01  -  12-23-19 @ 11:23  --------------------------------------------------------  IN: 340 mL / OUT: 0 mL / NET: 340 mL      Physical Exam:  	Gen: NAD,  	HEENT: Supple neck  	Pulm: CTA B/L  	CV: RRR, S1S2; no rub  	Back: No spinal or CVA tenderness; no sacral edema  	Abd: +BS, soft, nontender/nondistended  	: No suprapubic tenderness  	UE: Warm, no edema; no asterixis  	LE: Warm,no edema  	Neuro: No focal deficits  	Psych: Normal affect and mood  	Skin: Warm, without rashes  	  LABS/STUDIES  --------------------------------------------------------------------------------              12.8   4.66  >-----------<  276      [12-21-19 @ 13:53]              40.7     140  |  101  |  11.0  ----------------------------<  92      [12-22-19 @ 07:49]  3.7   |  24.0  |  0.79        Ca     10.8     [12-22-19 @ 07:49]      Mg     2.6     [12-21-19 @ 13:53]    TPro  8.6  /  Alb  4.6  /  TBili  0.8  /  DBili  x   /  AST  30  /  ALT  19  /  AlkPhos  117  [12-21-19 @ 13:53]    PT/INR: PT 12.4 , INR 1.08       [12-21-19 @ 13:53]  PTT: 27.5       [12-21-19 @ 13:53]    Troponin <0.01      [12-21-19 @ 13:53]    Creatinine Trend:  SCr 0.79 [12-22 @ 07:49]  SCr 0.79 [12-21 @ 13:53]    Urinalysis - [12-21-19 @ 16:21]      Color Yellow / Appearance Slightly Turbid / SG 1.010 / pH 8.0      Gluc Negative / Ketone Negative  / Bili Negative / Urobili Negative       Blood Negative / Protein Negative / Leuk Est Negative / Nitrite Negative      RBC 0-2 / WBC 0-2 / Hyaline  / Gran  / Sq Epi  / Non Sq Epi Few / Bacteria       HbA1c 5.6      [12-23-19 @ 09:18]  Lipid: chol 207, , HDL 50,       [12-22-19 @ 07:49]    HCV 0.08, Nonreact      [12-22-19 @ 16:48]

## 2019-12-23 NOTE — PROGRESS NOTE ADULT - SUBJECTIVE AND OBJECTIVE BOX
Internal Medicine Hospitalist Progress Note    follow up for stroke , facial droop , HTN   pt is seen examined in am , awake alert today , able to fallow simple commands   no overnight events reported           Vital Signs Last 24 Hrs  T(C): 36.7 (23 Dec 2019 08:30), Max: 37.4 (22 Dec 2019 20:44)  T(F): 98.1 (23 Dec 2019 08:30), Max: 99.4 (22 Dec 2019 20:44)  HR: 68 (23 Dec 2019 12:00) (63 - 99)  BP: 147/116 (23 Dec 2019 12:00) (147/116 - 172/118)  BP(mean): --  RR: 16 (23 Dec 2019 12:00) (14 - 19)  SpO2: 100% (23 Dec 2019 12:00) (95% - 100%)      Constitutional: awake alert , no distress     Respiratory: CTA bilateral     Cardiovascular: regular s1 /s2     Gastrointestinal: soft , BS + no tenderness     Extremities: no pretibial edema     Neurological: left sided weakness , alert , no distress able to fallow commands   l                        12.8   4.66  )-----------( 276      ( 21 Dec 2019 13:53 )             40.7   12-22    140  |  101  |  11.0  ----------------------------<  92  3.7   |  24.0  |  0.79    Ca    10.8<H>      22 Dec 2019 07:49  Mg     2.6     12-21    TPro  8.6  /  Alb  4.6  /  TBili  0.8  /  DBili  x   /  AST  30  /  ALT  19  /  AlkPhos  117  12-21

## 2019-12-23 NOTE — PROGRESS NOTE ADULT - SUBJECTIVE AND OBJECTIVE BOX
INTERVAL HISTORY:  no interval changes      VITAL SIGNS:  Vital Signs Last 24 Hrs  T(C): 36.7 (23 Dec 2019 08:30), Max: 37.4 (22 Dec 2019 20:44)  T(F): 98.1 (23 Dec 2019 08:30), Max: 99.4 (22 Dec 2019 20:44)  HR: 63 (23 Dec 2019 08:00) (63 - 99)  BP: 156/101 (23 Dec 2019 08:00) (147/119 - 172/118)  BP(mean): --  RR: 16 (23 Dec 2019 08:00) (14 - 19)  SpO2: 95% (23 Dec 2019 08:00) (95% - 100%)    PHYSICAL EXAMINATION:    Mentation:  awake and follows commands.   Language/Speech: answers questions - limited english  CN: left facial droop  Visual Fields: full to confroantion  Motor: left hemiplegia  Sensory:  DTR:  Babinski:      MEDS:  MEDICATIONS  (STANDING):  aspirin  chewable 81 milliGRAM(s) Oral daily  atorvastatin 80 milliGRAM(s) Oral at bedtime  heparin  Injectable 5000 Unit(s) SubCutaneous every 8 hours  labetalol 100 milliGRAM(s) Oral two times a day  losartan 100 milliGRAM(s) Oral daily    MEDICATIONS  (PRN):  acetaminophen   Tablet .. 650 milliGRAM(s) Oral every 6 hours PRN Temp greater or equal to 38C (100.4F), Mild Pain (1 - 3), Moderate Pain (4 - 6)      LABS:                          12.8   4.66  )-----------( 276      ( 21 Dec 2019 13:53 )             40.7     12-22    140  |  101  |  11.0  ----------------------------<  92  3.7   |  24.0  |  0.79    Ca    10.8<H>      22 Dec 2019 07:49  Mg     2.6     12-21    TPro  8.6  /  Alb  4.6  /  TBili  0.8  /  DBili  x   /  AST  30  /  ALT  19  /  AlkPhos  117  12-21    LIVER FUNCTIONS - ( 21 Dec 2019 13:53 )  Alb: 4.6 g/dL / Pro: 8.6 g/dL / ALK PHOS: 117 U/L / ALT: 19 U/L / AST: 30 U/L / GGT: x               RADIOLOGY & ADDITIONAL STUDIES:    h/o right cva with left hemiplegia  Additional acute right infarcts as per MRi  Severe right supraclinoid ICA intracranial stenosis    IMPRESSION & PLAN:    Cerebral infarcts right I territor  intracranial Farshad stenosis      Plan:  Cerebrovascular risk factor assessment and management  Medical and Cardiac evaluation and treatment as indicated  Antiplatelet therapy- would use dual therapy  Vascular team eval  MRI C spine

## 2019-12-23 NOTE — CONSULT NOTE ADULT - SUBJECTIVE AND OBJECTIVE BOX
The patient is a 74 year old female who presented to the ER. She has currently left sided weakness of both the arm and the leg. Patient can not state when this started.  Apparently she had left sided weakness prior to the stroke but the symptoms became worse. She states "Leg no good"    ROS: 12 pts negative except for hpi    Allergies: NKDA    Meds: As per chart review: Labetalol, Aspirin, Losartan    PMH: Stroke    FH:  Unable to obtain    Social: No history of toxic habits. From [facility]    vitals: P63 156/101 R16  GEN NAD  HEENT unable to see fundi clearly  CVS S1 S2 RRR  Lungs: Non labored breathing  EXT: no major edema  Skin: Dry  Abdomen: NT  Neuro: awake. able to state name. limited english proficiency. Followed simple commands.  CN: EOMI, VFF, face symmetric, facial sensation limited on left. left lower facial droop. swallow normal, hearing diminished b/l  Increased tone LUE  Motor: antigravity RUE, RLE  No movement LUE, LLE  Decreased sensation  Reflexes brisk LUE  Mute on right    Unable to test gait/coordination due to weakness

## 2019-12-23 NOTE — DISCHARGE NOTE NURSING/CASE MANAGEMENT/SOCIAL WORK - NSDCFUADDAPPT_GEN_ALL_CORE_FT
Neurology Appt: Friday 1/10/20 at 1:00 PM with Dr Dumont. [address] [place] [address]. [phone].    Pt has no concern about receiving or taking her medications.

## 2019-12-23 NOTE — CONSULT NOTE ADULT - ASSESSMENT
1- Multiple areas of new DWI signal on MRA indicative of new stroke.  Mainly in watershed distribution on right hemisphere  2- Severe intracranial atherosclerosis    Discussed with PA service- patient was only on aspirin. She was recently started on aspirin and plavix.  She has severe intracranial atherosclerosis. Clinically she does not fit the picture for a cerebral vasculitis  Agree with dual antiplatelet therapy for the near term. In light of SAMMPRIS trial, intracranial stenting is not warranted unless patient fails maximal medical management. Will recommend a trial of medical management first. If patient has any new symptoms would then consider an intracranial angioplasty/possible stenting   Maximize statin therapy (Lipitor 80mg or Crestor 40mg ) if no contraindications.  Avoid precipitous drops in blood pressure. Keep well hydrated to maintain adequate volume status.

## 2019-12-23 NOTE — CONSULT NOTE ADULT - SUBJECTIVE AND OBJECTIVE BOX
Palliative Medicine Initial Consultation Note    HPI:  History from chart- patient poor historian  74yoF hx prior CVA with residual left sided weakness, HTN presenting with left sided facial droop and slurred speech.  Pt [place] speaking, knows limited English, so hx obtained from chart review and verbal sign out from tele-hospitalist who spoke with pt daughter Faye and pt grandson Mark.  Only granddaughter Sheryl currently at bedside and can provide limited hx.  Pt has baseline left sided weakness from recent CVA she had 1 month ago (granddaughter reports she was at [facility] during CVA, however no record in EMR.  Pt unable to move her left arm due to contracture after stroke and ambulates with a walker.  Earlier this AM, they noticed that she had a left sided facial droop and her speech was not clear.  Per granddaughter at bedside, pt still having slurred speech when she talks.  Pt also had recent fall with impact to her left side and currently endorses left hip pain to me.  PERTINENT PMH REVIEWED:  [x ] YES [ ] NO         CVA (cerebral vascular accident)     Paralysis of left upper extremity.     PAST SURGICAL HISTORY:  No significant past surgical history.     No pertinent family history in first degree relatives.     No Pertinent Family History in first degree relatives of: CVA.    SOCIAL HISTORY:  EtOH [ ] Yes  [ x] No                                    Drugs [ ] Yes [x ] No                                   [ ] smoker [ ]x nonsmoker                                    Admitted from: [ x] home [ ] SNF _________ [ ] [facility] ________    Surrogate/HCP/Guardian: No HCP form in chart-  daughter Faye Botello  listed as contact - [phone]  FAMILY HISTORY:  No pertinent family history in first degree relatives      Baseline ADLs (prior to admission): unknown - poor historian  Independent [ ] moderately [ ] fully   Dependent   [ ] moderately [ ]fully    MEDICATIONS  (STANDING):  amLODIPine   Tablet 5 milliGRAM(s) Oral once  aspirin  chewable 81 milliGRAM(s) Oral daily  atorvastatin 80 milliGRAM(s) Oral at bedtime  clopidogrel Tablet 75 milliGRAM(s) Oral daily  heparin  Injectable 5000 Unit(s) SubCutaneous every 8 hours  losartan 100 milliGRAM(s) Oral daily    MEDICATIONS  (PRN):  acetaminophen   Tablet .. 650 milliGRAM(s) Oral every 6 hours PRN Temp greater or equal to 38C (100.4F), Mild Pain (1 - 3), Moderate Pain (4 - 6)      Allergies    No Known Allergies    Intolerances        REVIEW OF SYSTEMS       [ x] Unable to obtain due to poor historian      Karnofsky Performance Score/Palliative Performance Status Version 2:         %    Vital Signs Last 24 Hrs  T(C): 36.7 (23 Dec 2019 13:22), Max: 37.4 (22 Dec 2019 20:44)  T(F): 98.1 (23 Dec 2019 13:22), Max: 99.4 (22 Dec 2019 20:44)  HR: 80 (23 Dec 2019 14:53) (61 - 99)  BP: 167/94 (23 Dec 2019 14:53) (147/116 - 172/118)  BP(mean): 123 (23 Dec 2019 13:52) (123 - 123)  RR: 18 (23 Dec 2019 14:53) (14 - 19)  SpO2: 100% (23 Dec 2019 14:53) (95% - 100%)    PHYSICAL EXAM:    General: WD woman Awake alert1 NAD  HEENT: [x ] normal  [ ] dry mouth  [ ] ET tube/trach    Lungs: [ x] comfortable [ ] tachypnea/labored breathing  [ ] excessive secretions    CV: [ x] normal  [ ] tachycardia    GI: [ x] normal  [ ] distended  [ ] tender  [ ] no BS               [ ] PEG/NG/OG tube    : [ x] normal  [ ] incontinent  [ ] oliguria/anuria  [ ] carrillo    MSK: [ ] normal  [x ] weakness  [ ] edema             [ ] ambulatory  [ x] bedbound/wheelchair bound    Skin: [ ] normal  [ ] pressure ulcers- Stage_____  [ x] no rash    LABS:                        12.3   4.75  )-----------( 242      ( 23 Dec 2019 13:55 )             39.0         138  |  101  |  15.0  ----------------------------<  101  4.2   |  24.0  |  0.65    Ca    10.2      23 Dec 2019 13:55        Urinalysis Basic - ( 21 Dec 2019 16:21 )    Color: Yellow / Appearance: Slightly Turbid / S.010 / pH: x  Gluc: x / Ketone: Negative  / Bili: Negative / Urobili: Negative mg/dL   Blood: x / Protein: Negative mg/dL / Nitrite: Negative   Leuk Esterase: Negative / RBC: 0-2 /HPF / WBC 0-2   Sq Epi: x / Non Sq Epi: Few / Bacteria: x      I&O's Summary    22 Dec 2019 07:  -  23 Dec 2019 07:00  --------------------------------------------------------  IN: 780 mL / OUT: 1350 mL / NET: -570 mL    23 Dec 2019 07:  -  23 Dec 2019 15:42  --------------------------------------------------------  IN: 460 mL / OUT: 0 mL / NET: 460 mL        RADIOLOGY & ADDITIONAL STUDIES:  < from: CT Head No Cont (12.21.19 @ 12:12) >  EXAM:  CT BRAIN                          PROCEDURE DATE:  2019          INTERPRETATION:  Exam Date: 2019 12:12 PM    CT head without IV contrast    CLINICAL INFORMATION: slurred speech/ R gaz preference    TECHNIQUE: Contiguous axial 5mm sections were obtained through the head.       COMPARISON: None    FINDINGS:     Large area of hypoattenuation within the right superior frontal lobe, nonspecific, may reflect a subacute infarct. Very small area of hypoattenuation in the left medial superior frontal lobe, may reflect an additional subacute infarct. Encephalomalacia/gliosis within the right posterior occipital and temporal lobes, suggestive of a chronic infarct. The ventricles and sulci are prominent, compatible with age-related generalized cerebral volume loss.   There is no evidence of hemorrhage. There is periventricular and subcortical white matter hypoattenuation,  most compatible with chronic microvascular ischemic changes.   No mass effect is found in the brain.  There is no midline shift or herniation pattern.      The visualized portions of the paranasal sinuses and mastoid air cells are clear.      IMPRESSION:       Large area of hypoattenuation within the right superior frontal lobe, nonspecific, likely reflecting a subacute infarct. Very small area of hypoattenuation in the left medial superior frontal lobe, may reflect an additional subacute infarct. Encephalomalacia/gliosis within the right posterior occipital and temporal lobes, suggestive of a chronic infarct.    MRI bra    < end of copied text >  < from: CT Angio Neck w/ IV Cont (19 @ 17:58) >   EXAM:  CT CERVICAL SPINE                         EXAM:  CT ANGIO NECK (W)AW IC                         EXAM:  CT ANGIO BRAIN (W)AW IC                          PROCEDURE DATE:  2019          INTERPRETATION:  CLINICAL HISTORY: Slurred speech and right kidneys reference. Stroke.     TECHNIQUE:   Noncontrast head CT was followed by CT images acquired through the  neck and head  during the arterial phase.  Intravenous contrast:  94 cc of Omnipaque-300 mg/ml were administered; 6 cc were discarded.  Three-dimensional MIP reformats were generated.      COMPARISON STUDY: Noncontrast CT head performed earlier on the same day.    FINDINGS:     NONCONTRAST CT HEAD:    Redemonstration of areas of right frontoparietal hypoattenuation and loss of gray-white matter differentiation. Additional areas of hyperattenuation volume loss identified within the right temporal occipital region, suggestive of chronic infarct, however superimposed areas of acute/subacute infarct may be present. Grossly stable small focus of hypoattenuation within the left medial frontal lobe. No acute intracranial hemorrhage, significant mass effect, midline shift, extra-axial collection, or hydrocephalus.    Mild patchy hypodensities within the periventricular and subcortical white matter, although nonspecific, likely reflect chronic microvascular disease. Cerebral volume loss results in mild prominence of the ventricles and sulci. Intracranial atherosclerosis is present. Tiny hypodensities within the bilateral basal ganglia and thalami may represent age indeterminant lacunar infarcts.     The visualized paranasal sinuses and mastoid air cells are clear. Visualized osseous structures are intact.    CT CERVICAL SPINE:    No acute cervical spine fracture or evidence of traumatic malalignment. Nonspecific straightening of the normal cervical lordosis. Craniocervical junction is unremarkable. No facet joint dislocation. No prevertebral soft tissue swelling.    There are multilevel degenerative changes spine characterized by degenerative disc disease as well as bilateral uncovertebral and facet joint arthrosis contributing to varying degrees of neuroforaminal and spinal canal stenoses. Neuroforaminal narrowing is most prominent at the C6-C7 level bilaterally,as is spinal canal stenosis. Numerous small lucencies identified scattered throughout the visualized cervical spine, nonspecific.    CT ANGIOGRAPHY NECK:    Thoracic aorta and branch vessels: Patent. Atherosclerosis.  No flow-limiting stenosis.  No evidence of dissection.    Right carotid system: Patent.  No atherosclerosis.  No hemodynamically significant stenosis using NASCET criteria.  No evidence of dissection.    Left carotid system: Patent.  No atherosclerosis.  No hemodynamically significant stenosis using NASCET criteria.  No evidence of dissection.    Vertebral arteries: Patent.  No atherosclerosis.  No flow-limiting stenosis.  No evidence of dissection. Tortuous vessels bilaterally, left greater than right.    Soft tissues of the neck: Ill-defined hypoattenuating nodules within the right thyroid lobe measuring up to 8 mm, which can be further characterized with nonemergent renal ultrasound as quickly warranted.    Visualized upper chest: Unremarkable.    CT ANGIOGRAPHY BRAIN:    Internal carotid arteries: Atherosclerosis contributing to severe stenosis of the right supraclinoid segment of the internal carotid artery. Mildly irregular fusiform dilatation of the cavernous segment of the left internal carotid artery.    Anterior cerebral arteries: Atherosclerosis, grossly patent bilaterally with areas of mild stenoses.    Middle cerebral arteries: Patent bilaterally. Areas of mild stenosis within the distal M1/proximal M2 branches on the right.    Anterior communicating artery: Not clearly visualized.    Posterior communicating arteries: Visualized bilaterally.    Posterior cerebral arteries: Atherosclerosis contributing to areas of mild proximal luminal stenosis.    Vertebrobasilar: Patent. Mild mid basilar luminal stenosis. The distal vertebral arteries are similar in caliber.     Dural venous sinuses: Grossly patent.    IMPRESSION:     Noncontrast CT Head: Redemonstration of areas of hypodensity within the right frontal lobe, concerning for acute/subacute infarct. Chronic right temporal occipital infarct with possible superimposed age-indeterminate infarct. Stable left medial frontal lobe hypodensity. No acute intracranial hemorrhage or significant mass effect.    CT cervical spine: No acute cervical spinal fracture or evidence of traumatic malalignment. Numerous small rounded lucencies scattered throughout the visualized spine. Further evaluation with nonemergent MRI and/or bone scan is recommended as findings may be seen in the setting of underlying malignancy.    CTA Neck: No flow-limiting stenosis or occlusion within the cervical carotid or vertebral arteries.     CTA Head: Intracranial atherosclerosis contributing to severe stenosis of the right supraclinoid internal carotid artery. Additional areas of scattered intracranial luminal stenoses, including the mid basilar segment, which demonstrates mild luminal stenosis.    < end of copied text >      ADVANCE DIRECTIVES:  [ ] YES [ x] NO   DNR [ ] YES [ x] NO  Completed on:                     MOLST  [ ] YES [ x] NO   Completed on:  Living Will  [ ] YES [ x] NO   Completed on:    Thank you for the opportunity to assist with the care of this patient.   [facility] Consult Service [phone]. Palliative Medicine Initial Consultation Note    HPI:  History from chart- patient poor historian  74yoF hx prior CVA with residual left sided weakness, HTN presenting with left sided facial droop and slurred speech.  Pt [place] speaking, knows limited English, so hx obtained from chart review and verbal sign out from tele-hospitalist who spoke with pt daughter Faye and pt grandson Mark.  Only granddaughter Sheryl currently at bedside and can provide limited hx.  Pt has baseline left sided weakness from recent CVA she had 1 month ago (granddaughter reports she was at [facility] during CVA, however no record in EMR.  Pt unable to move her left arm due to contracture after stroke and ambulates with a walker.  Earlier this AM, they noticed that she had a left sided facial droop and her speech was not clear.  Per granddaughter at bedside, pt still having slurred speech when she talks.  Pt also had recent fall with impact to her left side and currently endorses left hip pain to me.  PERTINENT PMH REVIEWED:  [x ] YES [ ] NO         CVA (cerebral vascular accident)     Paralysis of left upper extremity.     PAST SURGICAL HISTORY:  No significant past surgical history.     No pertinent family history in first degree relatives.     No Pertinent Family History in first degree relatives of: CVA.    SOCIAL HISTORY:  EtOH [ ] Yes  [ x] No                                    Drugs [ ] Yes [x ] No                                   [ ] smoker [ ]x nonsmoker                                    Admitted from: [ x] home [ ] SNF _________ [ ] [facility] ________    Surrogate/HCP/Guardian: No HCP form in chart-  daughter Faye Botello  listed as contact - [phone]  FAMILY HISTORY:  No pertinent family history in first degree relatives      Baseline ADLs (prior to admission): unknown - poor historian  Independent [ ] moderately [ ] fully   Dependent   [ ] moderately [ ]fully    MEDICATIONS  (STANDING):  amLODIPine   Tablet 5 milliGRAM(s) Oral once  aspirin  chewable 81 milliGRAM(s) Oral daily  atorvastatin 80 milliGRAM(s) Oral at bedtime  clopidogrel Tablet 75 milliGRAM(s) Oral daily  heparin  Injectable 5000 Unit(s) SubCutaneous every 8 hours  losartan 100 milliGRAM(s) Oral daily    MEDICATIONS  (PRN):  acetaminophen   Tablet .. 650 milliGRAM(s) Oral every 6 hours PRN Temp greater or equal to 38C (100.4F), Mild Pain (1 - 3), Moderate Pain (4 - 6)      Allergies    No Known Allergies    Intolerances        REVIEW OF SYSTEMS       [ x] Unable to obtain due to poor historian      Karnofsky Performance Score/Palliative Performance Status Version 2:         %    Vital Signs Last 24 Hrs  T(C): 36.7 (23 Dec 2019 13:22), Max: 37.4 (22 Dec 2019 20:44)  T(F): 98.1 (23 Dec 2019 13:22), Max: 99.4 (22 Dec 2019 20:44)  HR: 80 (23 Dec 2019 14:53) (61 - 99)  BP: 167/94 (23 Dec 2019 14:53) (147/116 - 172/118)  BP(mean): 123 (23 Dec 2019 13:52) (123 - 123)  RR: 18 (23 Dec 2019 14:53) (14 - 19)  SpO2: 100% (23 Dec 2019 14:53) (95% - 100%)    PHYSICAL EXAM:    General: WD woman Awake alert1 NAD  HEENT: [x ] normal  [ ] dry mouth  [ ] ET tube/trach    Lungs: [ x] comfortable [ ] tachypnea/labored breathing  [ ] excessive secretions    CV: [ x] normal  [ ] tachycardia    GI: [ x] normal  [ ] distended  [ ] tender  [ ] no BS               [ ] PEG/NG/OG tube    : [ x] normal  [ ] incontinent  [ ] oliguria/anuria  [ ] carrillo    MSK: [ ] normal  [x ] weakness  [ ] edema             [ ] ambulatory  [ x] bedbound/wheelchair bound    Skin: [ ] normal  [ ] pressure ulcers- Stage_____  [ x] no rash  Neuro - left facial droop    LABS:                        12.3   4.75  )-----------( 242      ( 23 Dec 2019 13:55 )             39.0         138  |  101  |  15.0  ----------------------------<  101  4.2   |  24.0  |  0.65    Ca    10.2      23 Dec 2019 13:55        Urinalysis Basic - ( 21 Dec 2019 16:21 )    Color: Yellow / Appearance: Slightly Turbid / S.010 / pH: x  Gluc: x / Ketone: Negative  / Bili: Negative / Urobili: Negative mg/dL   Blood: x / Protein: Negative mg/dL / Nitrite: Negative   Leuk Esterase: Negative / RBC: 0-2 /HPF / WBC 0-2   Sq Epi: x / Non Sq Epi: Few / Bacteria: x      I&O's Summary    22 Dec 2019 07:  -  23 Dec 2019 07:00  --------------------------------------------------------  IN: 780 mL / OUT: 1350 mL / NET: -570 mL    23 Dec 2019 07:  -  23 Dec 2019 15:42  --------------------------------------------------------  IN: 460 mL / OUT: 0 mL / NET: 460 mL        RADIOLOGY & ADDITIONAL STUDIES:  < from: CT Head No Cont (12..19 @ 12:12) >  EXAM:  CT BRAIN                          PROCEDURE DATE:  2019          INTERPRETATION:  Exam Date: 2019 12:12 PM    CT head without IV contrast    CLINICAL INFORMATION: slurred speech/ R gaz preference    TECHNIQUE: Contiguous axial 5mm sections were obtained through the head.       COMPARISON: None    FINDINGS:     Large area of hypoattenuation within the right superior frontal lobe, nonspecific, may reflect a subacute infarct. Very small area of hypoattenuation in the left medial superior frontal lobe, may reflect an additional subacute infarct. Encephalomalacia/gliosis within the right posterior occipital and temporal lobes, suggestive of a chronic infarct. The ventricles and sulci are prominent, compatible with age-related generalized cerebral volume loss.   There is no evidence of hemorrhage. There is periventricular and subcortical white matter hypoattenuation,  most compatible with chronic microvascular ischemic changes.   No mass effect is found in the brain.  There is no midline shift or herniation pattern.      The visualized portions of the paranasal sinuses and mastoid air cells are clear.      IMPRESSION:       Large area of hypoattenuation within the right superior frontal lobe, nonspecific, likely reflecting a subacute infarct. Very small area of hypoattenuation in the left medial superior frontal lobe, may reflect an additional subacute infarct. Encephalomalacia/gliosis within the right posterior occipital and temporal lobes, suggestive of a chronic infarct.    MRI bra    < end of copied text >  < from: CT Angio Neck w/ IV Cont (19 @ 17:58) >   EXAM:  CT CERVICAL SPINE                         EXAM:  CT ANGIO NECK (W)AW IC                         EXAM:  CT ANGIO BRAIN (W)AW IC                          PROCEDURE DATE:  2019          INTERPRETATION:  CLINICAL HISTORY: Slurred speech and right kidneys reference. Stroke.     TECHNIQUE:   Noncontrast head CT was followed by CT images acquired through the  neck and head  during the arterial phase.  Intravenous contrast:  94 cc of Omnipaque-300 mg/ml were administered; 6 cc were discarded.  Three-dimensional MIP reformats were generated.      COMPARISON STUDY: Noncontrast CT head performed earlier on the same day.    FINDINGS:     NONCONTRAST CT HEAD:    Redemonstration of areas of right frontoparietal hypoattenuation and loss of gray-white matter differentiation. Additional areas of hyperattenuation volume loss identified within the right temporal occipital region, suggestive of chronic infarct, however superimposed areas of acute/subacute infarct may be present. Grossly stable small focus of hypoattenuation within the left medial frontal lobe. No acute intracranial hemorrhage, significant mass effect, midline shift, extra-axial collection, or hydrocephalus.    Mild patchy hypodensities within the periventricular and subcortical white matter, although nonspecific, likely reflect chronic microvascular disease. Cerebral volume loss results in mild prominence of the ventricles and sulci. Intracranial atherosclerosis is present. Tiny hypodensities within the bilateral basal ganglia and thalami may represent age indeterminant lacunar infarcts.     The visualized paranasal sinuses and mastoid air cells are clear. Visualized osseous structures are intact.    CT CERVICAL SPINE:    No acute cervical spine fracture or evidence of traumatic malalignment. Nonspecific straightening of the normal cervical lordosis. Craniocervical junction is unremarkable. No facet joint dislocation. No prevertebral soft tissue swelling.    There are multilevel degenerative changes spine characterized by degenerative disc disease as well as bilateral uncovertebral and facet joint arthrosis contributing to varying degrees of neuroforaminal and spinal canal stenoses. Neuroforaminal narrowing is most prominent at the C6-C7 level bilaterally,as is spinal canal stenosis. Numerous small lucencies identified scattered throughout the visualized cervical spine, nonspecific.    CT ANGIOGRAPHY NECK:    Thoracic aorta and branch vessels: Patent. Atherosclerosis.  No flow-limiting stenosis.  No evidence of dissection.    Right carotid system: Patent.  No atherosclerosis.  No hemodynamically significant stenosis using NASCET criteria.  No evidence of dissection.    Left carotid system: Patent.  No atherosclerosis.  No hemodynamically significant stenosis using NASCET criteria.  No evidence of dissection.    Vertebral arteries: Patent.  No atherosclerosis.  No flow-limiting stenosis.  No evidence of dissection. Tortuous vessels bilaterally, left greater than right.    Soft tissues of the neck: Ill-defined hypoattenuating nodules within the right thyroid lobe measuring up to 8 mm, which can be further characterized with nonemergent renal ultrasound as quickly warranted.    Visualized upper chest: Unremarkable.    CT ANGIOGRAPHY BRAIN:    Internal carotid arteries: Atherosclerosis contributing to severe stenosis of the right supraclinoid segment of the internal carotid artery. Mildly irregular fusiform dilatation of the cavernous segment of the left internal carotid artery.    Anterior cerebral arteries: Atherosclerosis, grossly patent bilaterally with areas of mild stenoses.    Middle cerebral arteries: Patent bilaterally. Areas of mild stenosis within the distal M1/proximal M2 branches on the right.    Anterior communicating artery: Not clearly visualized.    Posterior communicating arteries: Visualized bilaterally.    Posterior cerebral arteries: Atherosclerosis contributing to areas of mild proximal luminal stenosis.    Vertebrobasilar: Patent. Mild mid basilar luminal stenosis. The distal vertebral arteries are similar in caliber.     Dural venous sinuses: Grossly patent.    IMPRESSION:     Noncontrast CT Head: Redemonstration of areas of hypodensity within the right frontal lobe, concerning for acute/subacute infarct. Chronic right temporal occipital infarct with possible superimposed age-indeterminate infarct. Stable left medial frontal lobe hypodensity. No acute intracranial hemorrhage or significant mass effect.    CT cervical spine: No acute cervical spinal fracture or evidence of traumatic malalignment. Numerous small rounded lucencies scattered throughout the visualized spine. Further evaluation with nonemergent MRI and/or bone scan is recommended as findings may be seen in the setting of underlying malignancy.    CTA Neck: No flow-limiting stenosis or occlusion within the cervical carotid or vertebral arteries.     CTA Head: Intracranial atherosclerosis contributing to severe stenosis of the right supraclinoid internal carotid artery. Additional areas of scattered intracranial luminal stenoses, including the mid basilar segment, which demonstrates mild luminal stenosis.    < end of copied text >      ADVANCE DIRECTIVES:  [ ] YES [ x] NO   DNR [ ] YES [ x] NO  Completed on:                     MOLST  [ ] YES [ x] NO   Completed on:  Living Will  [ ] YES [ x] NO   Completed on:    Thank you for the opportunity to assist with the care of this patient.   [facility] Consult Service [phone].

## 2019-12-23 NOTE — CONSULT NOTE ADULT - ASSESSMENT
74yr woman hx CVA with residual left sided weakness, HTN admitted for CVA incidental finding of cervical bone lesion concerning for possible malignancy.

## 2019-12-23 NOTE — PROGRESS NOTE ADULT - ASSESSMENT
74yoF hx prior CVA with residual left sided weakness, HTN presenting with left sided facial droop and slurred speech, being admitted for CVA work up, left hip pain after fall xray ? fracture can not rule out , Ct scan ordered     1- CVA acute subacute with old stroke   pt had MR of brain,  cont aspirin ,statin   BP control  PT   SW referal for discharge planning   neurology consulted per ED/nocturnist  note     2- High BP - resume home medication    3- left leg hip pain s/p fall at home   xray not clear   d/w radiologist rec CT of pelvis ordered r/o occult fracture 74yoF hx prior CVA with residual left sided weakness, HTN presenting with left sided facial droop and slurred speech, being admitted for CVA work up, left hip pain after fall xray ? fracture can not rule out , Ct scan ordered     1- CVA acute subacute with old stroke   pt had MR of brain positive  cont aspirin ,statin   BP control, cardiology  called for possible DANA   neurology consult appreciated     2-Uncontrolled HTN- cont to adjust meds for better control     3- left leg hip pain s/p fall at home r/o fracture     d/w radiologist rec CT of pelvis ordered r/o occult fracture     4- bone lesion with hypercalcemia   r/o malignancy   MR of c spine ordered   will d/w daughter   may need bone scan   UPEP

## 2019-12-23 NOTE — CONSULT NOTE ADULT - ASSESSMENT
1) Hypercalcemia  2) MR brain with decreased signal in C3 verterbral body; Myeloma vs Lymphoma    Pt with hypercalcemia; Ca++ levels 10.9 on presentation now improving to 10.1  Upon chart review; Ca++ levels elevated at 10.9 in 2014, 11.1 in 11/2019  Hb normal; pt also with normal kidney funcion  Recommend to obtain iPTH, PTHrP, 1,25 (OH)2 Vitamin D and 25- OH vitamin D  UA bland; repeat UA and quantify TP/Cr ratio  Will extend to Myeloma work up based on above investigation  Will follow

## 2019-12-23 NOTE — CHART NOTE - NSCHARTNOTEFT_GEN_A_CORE
Please return for lab fasting (nothing to eat or drink for 10-12 hours, except water/black coffee ok) 2-3 days prior to your next visit in 1 months.    Lab weekdays- 8-5 M-F  Saturdays - 8-11am  [facility], by [address]    This will allow us to discuss the lab results and make any necessary medication adjustments together at the appointment. Be aware that the results will be [facility] once they are completed.  We will notify you prior to the appointment of the results only if there are significant abnormalities that need to be addressed before we meet in person to discuss them.    Please arrive 15-20 minutes prior to your scheduled appointment time. This allows for time to get checked in, all paperwork completed, and allows nursing staff to complete their rooming. It will allow us to spend the whole allotted appointment time together, rather than using some of the appointment time for things such as check in, and rooming.    In addition, if your next appointment is a health maintenance exam, or physical, insurance does offer one \"free\" visit per year to discuss health maintenance/screening. This is to ensure all immunizations, and screening such as Paps, routine labs, mammograms, colonoscopies, etc. are up-to-date. This does not cover any questions or concerns you may bring to the appointment, or managing chronic medications or medical problems. If, at the appointment, we do discuss concerns, or chronic medical problems, insurance will not cover that under \"screening\", and you may be charged an additional office visit. As always, it is better to make a concern based appointment as specific issues and concerns come up, rather than keeping a list of several concerns to address at one appointment. That way we will have adequate time to address each concern.    If you have any questions regarding your results please call: [facility] at [phone], or [facility]  called by rn for elevated blood pressure    sbp 180s and dbp 110s     no neuro changes currently     labetalol 10mg ivp x 1  continue to monitor [facility] at [phone].      Thank you for allowing [facility] to participate in your healthcare.  If you receive a survey in the mail, please complete it as we use them to make sure our care is the best it can be and identify areas where we can improve.    For questions regarding cost of imaging/ect, contact the Billing Department at [phone]    Diet/lifestyle suggestions:    1. Drink at least 8, 8 ounce glasses of water a day.    2. Eliminate all diet sodas, artificial sweeteners.    3. Read labels. Try to stay below 30 grams of sugar total per day. This 30 grams does not include fruits, vegetables, or plain dairy.  It does include everything that is listed on the nutrition label of all other foods.  Pay attention to portion size-if you have double the portion, it is double the sugar.    4. Avoid processed foods as much as possible. Read labels and buy foods with the fewest ingredients possible.    5. Read labels for portion size. Use a salad plate instead of a dinner plate. Half of the salad plate should be fruits and vegetables, a quarter protein, a quarter carbohydrates.    6. Snacks should be low sugar, high protein, or fruits and vegetables.    7. Carbs should be whole grain, and small portions, low sugar and as unprocessed as possible    8. Consider Mediterranean type diet--see below.    9. Consider an varghese such as [employer] to log foods/calorie consumption.    10. Exercise a minimum of 40 minutes 5 days per week. Consider getting an inexpensive step/fitness tracker. Aim for 10,000 steps per day. (For more motivation, there is evidence that regular cardiovascular activity significantly decreases the risk of age related memory issues/decline).    11. Before going back for seconds, consider setting a timer for 15 minutes to see if you are still hungry.  Allow time for your hormones to kick in and help you feel full.    12. Before a snack, ask if you are really hungry, thirsty, or if you are eating  because of boredom or stress    13. While making dinner, set out fresh cut up fruits/veggies, to avoid snacking on unhealthy things right before a meal    14. Aim for 5 servings of fruits and vegetables daily     15. Consider Weight Watchers, book: Obesity Code by Dr Kwan Alcala, or [facility]--[place]: [phone] [place] [phone].  [email or web address].  [place]. Also consider the [employer] varghese    BMI INTERPRETATION  <18.5 Underweight  18.5-24.9 Normal  25.0-29.9 Overweight  30.0-34.9 Obesity-Grade I  35.0-39.9 Obesity-Grade II  >/= 40.0 Extreme/Morbid Obesity-Grade III  If you are concerned with your classification, please discuss with me.    Mediterranean Diet: A heart-healthy eating plan  The heart-healthy Mediterranean is a healthy eating plan based on typical foods and recipes of Mediterranean-style cooking.  Here's how to adopt the Mediterranean diet.  By [facility] Staff   If you're looking for a heart-healthy eating plan, the Mediterranean diet might be right for you.  The Mediterranean diet incorporates the basics of healthy eating -- plus a splash of flavorful olive oil and perhaps even a glass of red wine -- among other components characterizing the traditional cooking style of countries bordering the [place].   Most healthy diets include fruits, vegetables, fish and whole grains, and limit unhealthy fats.  While these parts of a healthy diet remain tried-and-true, subtle variations or differences in proportions of certain foods may make a difference in your risk of heart disease.   Benefits of the Mediterranean diet  Research has shown that the traditional Mediterranean diet reduces the risk of heart disease. In fact, an analysis of more than 1.5 million healthy adults demonstrated that following a Mediterranean diet was associated with a reduced risk of death from heart disease and cancer, as well as a reduced incidence of Parkinson's and  called by rn for elevated blood pressure    sbp 180s and dbp 110s     no new neuro changes currently     labetalol 10mg ivp x 1  continue to monitor Alzheimer's diseases.   The Dietary Guidelines for Americans recommends the Mediterranean diet as an eating plan that can help promote health and prevent disease. And the Mediterranean diet is one your whole family can follow for good health.     Key components of the Mediterranean diet  The Mediterranean diet emphasizes:   • Eating primarily plant-based foods, such as fruits and vegetables, whole grains, legumes and nuts   • Replacing butter with healthy fats, such as olive oil   • Using herbs and spices instead of salt to flavor foods   • Limiting red meat to no more than a few times a month   • Eating fish and poultry at least twice a week   • Drinking red wine in moderation (optional)  The diet also recognizes the importance of being physically active, and enjoying meals with family and friends.   Focus on fruits, vegetables, nuts and grains  The Mediterranean diet traditionally includes fruits, vegetables and grains. For example, residents of [facility] average six or more servings a day of antioxidant-rich fruits and vegetables.   Grains in the Mediterranean region are typically whole grain and usually contain very few unhealthy trans fats, and bread is an important part of the diet. However, throughout the Mediterranean region, bread is eaten plain or dipped in olive oil -- not eaten with butter or margarine, which contains saturated or trans fats.   Nuts are another part of a healthy Mediterranean diet. Nuts are high in fat, but most of the fat is healthy. Because nuts are high in calories, they should not be eaten in large amounts -- generally no more than a handful a day. For the best nutrition, avoid candied or honey-roasted and heavily salted nuts.     Choose healthier fats  The focus of the Mediterranean diet isn't on limiting total fat consumption, but rather on choosing healthier types of fat. The Mediterranean diet discourages saturated fats and hydrogenated oils (trans fats), both of which contribute to  heart disease.   The Mediterranean diet features olive oil as the primary source of fat. Olive oil is mainly monounsaturated fat -- a type of fat that can help reduce low-density lipoprotein (LDL) cholesterol levels when used in place of saturated or trans fats. \"Extra-virgin\" and \"virgin\" olive oils (the least processed forms) also contain the highest levels of protective plant compounds that provide antioxidant effects.   Canola oil and some nuts contain the beneficial linolenic acid (a type of omega-3 fatty acid) in addition to healthy unsaturated fat. Omega-3 fatty acids lower triglycerides, decrease blood clotting, and are associated with decreased incidence of sudden heart attacks, improve the health of your blood vessels, and help moderate blood pressure. Fatty fish -- such as mackerel, lake trout, herring, sardines, albacore tuna and salmon -- are rich sources of omega-3 fatty acids. Fish is eaten on a regular basis in the Mediterranean diet.   What about wine?  The health effects of alcohol have been debated for many years, and some doctors are reluctant to encourage alcohol consumption because of the health consequences of excessive drinking. However, alcohol -- in moderation -- has been associated with a reduced risk of heart disease in some research studies.   The Mediterranean diet typically includes a moderate amount of wine, usually red wine. This means no more than 5 ounces (148 milliliters) of wine daily for women of all ages and men older than age 65 and no more than 10 ounces (296 milliliters) of wine daily for younger men.  More than this may increase the risk of health problems, including increased risk of certain types of cancer.   If you're unable to limit your alcohol intake to the amounts defined above, if you have a personal or family history of alcohol abuse, or if you have heart or liver disease, refrain from drinking wine or any other alcohol.   Putting it all together  The Mediterranean  diet is a delicious and healthy way to eat.  Many people who switch to this style of eating say they'll never eat any other way.  Here are some specific steps to get you started:   • Eat your veggies and fruits -- and switch to whole grains.  A variety of plant foods should make up the majority of your meals.  They should be minimally processed -- fresh and whole are best.  Include veggies and fruits in every meal and eat them for snacks as well.  Switch to whole-grain bread and cereal, and begin to eat more whole-grain rice and pasta products.  Keep baby carrots, apples and bananas on hand for quick, satisfying snacks.  Fruit salads are a wonderful way to eat a variety of healthy fruit.   • Go nuts. Nuts and seeds are good sources of fiber, protein and healthy fats. Keep almonds, cashews, pistachios and walnuts on hand for a quick snack. Choose natural peanut butter, rather than the kind with hydrogenated fat added. Try blended sesame seeds (tahini) as a dip or spread for bread.   • Pass on the butter.  Try olive oil as a healthy replacement for butter or margarine.  Lightly drizzle it over vegetables.  After cooking pasta, add a touch of olive oil, some garlic and green onions for flavoring. Dip bread in flavored olive oil or lightly spread it on whole-grain bread for a tasty alternative to butter.  Try tahini as a dip or spread for bread too.   • Spice it up.  Herbs and spices make food tasty and can  for salt and fat in recipes.   • Go fish.  Eat fish at least twice a week.  Fresh or water-packed tuna, salmon, trout, mackerel and herring are healthy choices.  [place]ley, bake or broil fish for great taste and easy cleanup.  Avoid breaded and fried fish.   • Rein in the red meat.  Limit red meat to no more than a few times a month.  Substitute fish and poultry for red meat.  When choosing red meat, make sure it's lean and keep portions small (about the size of a deck of cards).  Also avoid sausage, munguia and  other high-fat, processed meats.

## 2019-12-24 LAB
24R-OH-CALCIDIOL SERPL-MCNC: 22.6 NG/ML — LOW (ref 30–80)
ANION GAP SERPL CALC-SCNC: 14 MMOL/L — SIGNIFICANT CHANGE UP (ref 5–17)
BUN SERPL-MCNC: 16 MG/DL — SIGNIFICANT CHANGE UP (ref 8–20)
CALCIUM SERPL-MCNC: 10.9 MG/DL — HIGH (ref 8.4–10.5)
CALCIUM SERPL-MCNC: 9.9 MG/DL — SIGNIFICANT CHANGE UP (ref 8.6–10.2)
CALCIUM UR-MCNC: 12.4 MG/DL — SIGNIFICANT CHANGE UP
CHLORIDE SERPL-SCNC: 101 MMOL/L — SIGNIFICANT CHANGE UP (ref 98–107)
CO2 SERPL-SCNC: 22 MMOL/L — SIGNIFICANT CHANGE UP (ref 22–29)
CREAT SERPL-MCNC: 0.81 MG/DL — SIGNIFICANT CHANGE UP (ref 0.5–1.3)
GLUCOSE SERPL-MCNC: 111 MG/DL — SIGNIFICANT CHANGE UP (ref 70–115)
HCT VFR BLD CALC: 37.3 % — SIGNIFICANT CHANGE UP (ref 34.5–45)
HGB BLD-MCNC: 11.7 G/DL — SIGNIFICANT CHANGE UP (ref 11.5–15.5)
MCHC RBC-ENTMCNC: 28.1 PG — SIGNIFICANT CHANGE UP (ref 27–34)
MCHC RBC-ENTMCNC: 31.4 GM/DL — LOW (ref 32–36)
MCV RBC AUTO: 89.4 FL — SIGNIFICANT CHANGE UP (ref 80–100)
PLATELET # BLD AUTO: 274 K/UL — SIGNIFICANT CHANGE UP (ref 150–400)
POTASSIUM SERPL-MCNC: 4.2 MMOL/L — SIGNIFICANT CHANGE UP (ref 3.5–5.3)
POTASSIUM SERPL-SCNC: 4.2 MMOL/L — SIGNIFICANT CHANGE UP (ref 3.5–5.3)
PTH-INTACT FLD-MCNC: 54 PG/ML — SIGNIFICANT CHANGE UP (ref 15–65)
RBC # BLD: 4.17 M/UL — SIGNIFICANT CHANGE UP (ref 3.8–5.2)
RBC # FLD: 13 % — SIGNIFICANT CHANGE UP (ref 10.3–14.5)
SODIUM SERPL-SCNC: 137 MMOL/L — SIGNIFICANT CHANGE UP (ref 135–145)
VIT D25+D1,25 OH+D1,25 PNL SERPL-MCNC: 59.3 PG/ML — SIGNIFICANT CHANGE UP (ref 19.9–79.3)
WBC # BLD: 5.02 K/UL — SIGNIFICANT CHANGE UP (ref 3.8–10.5)
WBC # FLD AUTO: 5.02 K/UL — SIGNIFICANT CHANGE UP (ref 3.8–10.5)

## 2019-12-24 PROCEDURE — 99233 SBSQ HOSP IP/OBS HIGH 50: CPT

## 2019-12-24 RX ORDER — TRAMADOL HYDROCHLORIDE 50 MG/1
25 TABLET ORAL EVERY 6 HOURS
Refills: 0 | Status: DISCONTINUED | OUTPATIENT
Start: 2019-12-24 | End: 2019-12-27

## 2019-12-24 RX ORDER — HYDRALAZINE HCL 50 MG
10 TABLET ORAL ONCE
Refills: 0 | Status: COMPLETED | OUTPATIENT
Start: 2019-12-24 | End: 2019-12-24

## 2019-12-24 RX ORDER — LABETALOL HCL 100 MG
100 TABLET ORAL
Refills: 0 | Status: DISCONTINUED | OUTPATIENT
Start: 2019-12-24 | End: 2019-12-24

## 2019-12-24 RX ORDER — ERGOCALCIFEROL 1.25 MG/1
2000 CAPSULE ORAL DAILY
Refills: 0 | Status: DISCONTINUED | OUTPATIENT
Start: 2019-12-24 | End: 2019-12-26

## 2019-12-24 RX ORDER — AMLODIPINE BESYLATE 2.5 MG/1
10 TABLET ORAL DAILY
Refills: 0 | Status: DISCONTINUED | OUTPATIENT
Start: 2019-12-24 | End: 2019-12-27

## 2019-12-24 RX ORDER — LABETALOL HCL 100 MG
10 TABLET ORAL ONCE
Refills: 0 | Status: COMPLETED | OUTPATIENT
Start: 2019-12-24 | End: 2019-12-24

## 2019-12-24 RX ORDER — LABETALOL HCL 100 MG
100 TABLET ORAL
Refills: 0 | Status: DISCONTINUED | OUTPATIENT
Start: 2019-12-24 | End: 2019-12-26

## 2019-12-24 RX ADMIN — TRAMADOL HYDROCHLORIDE 25 MILLIGRAM(S): 50 TABLET ORAL at 22:10

## 2019-12-24 RX ADMIN — TRAMADOL HYDROCHLORIDE 25 MILLIGRAM(S): 50 TABLET ORAL at 21:36

## 2019-12-24 RX ADMIN — LIDOCAINE 1 PATCH: 4 CREAM TOPICAL at 12:11

## 2019-12-24 RX ADMIN — Medication 2 INCH(S): at 10:04

## 2019-12-24 RX ADMIN — HEPARIN SODIUM 5000 UNIT(S): 5000 INJECTION INTRAVENOUS; SUBCUTANEOUS at 21:36

## 2019-12-24 RX ADMIN — AMLODIPINE BESYLATE 10 MILLIGRAM(S): 2.5 TABLET ORAL at 13:53

## 2019-12-24 RX ADMIN — Medication 10 MILLIGRAM(S): at 13:56

## 2019-12-24 RX ADMIN — TRAMADOL HYDROCHLORIDE 25 MILLIGRAM(S): 50 TABLET ORAL at 12:00

## 2019-12-24 RX ADMIN — Medication 650 MILLIGRAM(S): at 06:30

## 2019-12-24 RX ADMIN — TRAMADOL HYDROCHLORIDE 25 MILLIGRAM(S): 50 TABLET ORAL at 12:11

## 2019-12-24 RX ADMIN — Medication 81 MILLIGRAM(S): at 12:10

## 2019-12-24 RX ADMIN — Medication 650 MILLIGRAM(S): at 05:46

## 2019-12-24 RX ADMIN — HEPARIN SODIUM 5000 UNIT(S): 5000 INJECTION INTRAVENOUS; SUBCUTANEOUS at 05:46

## 2019-12-24 RX ADMIN — LIDOCAINE 1 PATCH: 4 CREAM TOPICAL at 20:45

## 2019-12-24 RX ADMIN — ATORVASTATIN CALCIUM 80 MILLIGRAM(S): 80 TABLET, FILM COATED ORAL at 21:33

## 2019-12-24 RX ADMIN — LOSARTAN POTASSIUM 100 MILLIGRAM(S): 100 TABLET, FILM COATED ORAL at 05:46

## 2019-12-24 RX ADMIN — Medication 10 MILLIGRAM(S): at 17:31

## 2019-12-24 RX ADMIN — LIDOCAINE 1 PATCH: 4 CREAM TOPICAL at 23:36

## 2019-12-24 RX ADMIN — CLOPIDOGREL BISULFATE 75 MILLIGRAM(S): 75 TABLET, FILM COATED ORAL at 12:10

## 2019-12-24 RX ADMIN — HEPARIN SODIUM 5000 UNIT(S): 5000 INJECTION INTRAVENOUS; SUBCUTANEOUS at 12:10

## 2019-12-24 RX ADMIN — Medication 100 MILLIGRAM(S): at 21:34

## 2019-12-24 NOTE — PROGRESS NOTE ADULT - SUBJECTIVE AND OBJECTIVE BOX
[facility] DIVISION OF KIDNEY DISEASES AND HYPERTENSION -- FOLLOW UP NOTE  --------------------------------------------------------------------------------  Chief Complaint: Hypercalcemia    24 hour events/subjective:      PAST HISTORY  --------------------------------------------------------------------------------  No significant changes to PMH, PSH, FHx, SHx, unless otherwise noted    ALLERGIES & MEDICATIONS  --------------------------------------------------------------------------------  Allergies  No Known Allergies      Standing Inpatient Medications  amLODIPine   Tablet 5 milliGRAM(s) Oral daily  aspirin  chewable 81 milliGRAM(s) Oral daily  atorvastatin 80 milliGRAM(s) Oral at bedtime  clopidogrel Tablet 75 milliGRAM(s) Oral daily  heparin  Injectable 5000 Unit(s) SubCutaneous every 8 hours  lactated ringers. 1000 milliLiter(s) IV Continuous <Continuous>  lidocaine   Patch 1 Patch Transdermal daily  losartan 100 milliGRAM(s) Oral daily    PRN Inpatient Medications  acetaminophen   Tablet .. 650 milliGRAM(s) Oral every 6 hours PRN      REVIEW OF SYSTEMS  --------------------------------------------------------------------------------  Gen: No weight changes, fatigue, fevers/chills, weakness  Skin: No rashes  Head/Eyes/Ears/Mouth: No headache; Normal hearing; Normal vision w/o blurriness  Respiratory: No dyspnea, cough, wheezing, hemoptysis  CV: No chest pain, PND, orthopnea  GI: No abdominal pain, diarrhea, constipation, nausea, vomiting, melena, hematochezia  : No increased frequency, dysuria, hematuria, nocturia  MSK: No joint pain/swelling; no back pain; no edema  Neuro: No dizziness/lightheadedness, weakness, seizures, numbness, tingling  Heme: No easy bruising or bleeding  Endo: No heat/cold intolerance  Psych: No significant nervousness, anxiety, stress, depression    All other systems were reviewed and are negative, except as noted.    VITALS/PHYSICAL EXAM  --------------------------------------------------------------------------------  T(C): 36.9 (12-24-19 @ 07:15), Max: 37.4 (12-23-19 @ 20:00)  HR: 106 (12-24-19 @ 08:22) (61 - 106)  BP: 124/91 (12-24-19 @ 08:22) (118/72 - 187/126)  RR: 26 (12-24-19 @ 08:22) (16 - 26)  SpO2: 97% (12-24-19 @ 08:22) (97% - 100%)      12-23-19 @ 07:01  -  12-24-19 @ 07:00  --------------------------------------------------------  IN: 1690 mL / OUT: 2 mL / NET: 1688 mL      Physical Exam:  Gen: NAD, well-appearing  HEENT: PERRL, supple neck  Pulm: CTA B/L  CV: RRR, S1S2; no rub  Back: No spinal or CVA tenderness; no sacral edema  Abd: +BS, soft, nontender/nondistended  : No suprapubic tenderness  UE: Warm, no clubbing, no edema; no asterixis  LE: Warm, no clubbing,  no edema  Neuro: No focal deficits  Psych: Normal affect and mood  Skin: Warm, without rashes  Vascular access:    LABS/STUDIES  --------------------------------------------------------------------------------              11.7   5.02  >-----------<  274      [12-24-19 @ 05:05]              37.3     137  |  101  |  16.0  ----------------------------<  111      [12-24-19 @ 05:05]  4.2   |  22.0  |  0.81        Ca     9.9     [12-24-19 @ 05:05]      Troponin <0.01      [12-23-19 @ 18:05]        [12-23-19 @ 13:55]    Creatinine Trend:  SCr 0.81 [12-24 @ 05:05]  SCr 0.65 [12-23 @ 13:55]  SCr 0.79 [12-22 @ 07:49]  SCr 0.79 [12-21 @ 13:53]    Urinalysis - [12-23-19 @ 21:30]      Color Yellow / Appearance Clear / SG 1.010 / pH 7.0      Gluc Negative / Ketone Negative  / Bili Negative / Urobili Negative       Blood Negative / Protein Negative / Leuk Est Negative / Nitrite Negative      RBC  / WBC  / Hyaline  / Gran  / Sq Epi  / Non Sq Epi  / Bacteria     Urine Creatinine 74      [12-23-19 @ 21:29]  Urine Protein 9.0      [12-23-19 @ 21:30]  Urine Sodium 62      [12-23-19 @ 21:31]  Urine Osmolality 396      [12-23-19 @ 21:30]    PTH -- (Ca 10.9)      [12-23-19 @ 21:31]   54  Vitamin D (25OH) 22.6      [12-23-19 @ 21:31]  HbA1c 5.6      [12-23-19 @ 09:18]  Lipid: chol 207, , HDL 50,       [12-22-19 @ 07:49]    HCV 0.08, Nonreact      [12-22-19 @ 16:48] [facility] DIVISION OF KIDNEY DISEASES AND HYPERTENSION -- FOLLOW UP NOTE  --------------------------------------------------------------------------------  Chief Complaint: Hypercalcemia    24 hour events/subjective:  No acute events  Pt seen and examined      PAST HISTORY  --------------------------------------------------------------------------------  No significant changes to PMH, PSH, FHx, SHx, unless otherwise noted    ALLERGIES & MEDICATIONS  --------------------------------------------------------------------------------  Allergies  No Known Allergies      Standing Inpatient Medications  amLODIPine   Tablet 5 milliGRAM(s) Oral daily  aspirin  chewable 81 milliGRAM(s) Oral daily  atorvastatin 80 milliGRAM(s) Oral at bedtime  clopidogrel Tablet 75 milliGRAM(s) Oral daily  heparin  Injectable 5000 Unit(s) SubCutaneous every 8 hours  lactated ringers. 1000 milliLiter(s) IV Continuous <Continuous>  lidocaine   Patch 1 Patch Transdermal daily  losartan 100 milliGRAM(s) Oral daily    PRN Inpatient Medications  acetaminophen   Tablet .. 650 milliGRAM(s) Oral every 6 hours PRN      REVIEW OF SYSTEMS  --------------------------------------------------------------------------------  Gen: No weight changes, fatigue, fevers/chills, weakness  Skin: No rashes  Head/Eyes/Ears/Mouth: No headache; Normal hearing; Normal vision w/o blurriness  Respiratory: No dyspnea, cough, wheezing, hemoptysis  CV: No chest pain, PND, orthopnea  GI: No abdominal pain, diarrhea, constipation, nausea, vomiting, melena, hematochezia  : No increased frequency, dysuria, hematuria, nocturia  MSK: No joint pain/swelling; no back pain; no edema  Neuro: No dizziness/lightheadedness, weakness, seizures, numbness, tingling  Heme: No easy bruising or bleeding  Endo: No heat/cold intolerance  Psych: No significant nervousness, anxiety, stress, depression    All other systems were reviewed and are negative, except as noted.    VITALS/PHYSICAL EXAM  --------------------------------------------------------------------------------  T(C): 36.9 (12-24-19 @ 07:15), Max: 37.4 (12-23-19 @ 20:00)  HR: 106 (12-24-19 @ 08:22) (61 - 106)  BP: 124/91 (12-24-19 @ 08:22) (118/72 - 187/126)  RR: 26 (12-24-19 @ 08:22) (16 - 26)  SpO2: 97% (12-24-19 @ 08:22) (97% - 100%)      12-23-19 @ 07:01  -  12-24-19 @ 07:00  --------------------------------------------------------  IN: 1690 mL / OUT: 2 mL / NET: 1688 mL      Physical Exam:               Gen: NAD,  	HEENT: Supple neck  	Pulm: CTA B/L  	CV: RRR, S1S2; no rub  	Back: No spinal or CVA tenderness; no sacral edema  	Abd: +BS, soft, nontender/nondistended  	: No suprapubic tenderness  	UE: Warm, no edema; no asterixis  	LE: Warm, no edema  	Neuro: No focal deficits  	Psych: Normal affect and mood  	Skin: Warm, without rashes  	    LABS/STUDIES  --------------------------------------------------------------------------------              11.7   5.02  >-----------<  274      [12-24-19 @ 05:05]              37.3     137  |  101  |  16.0  ----------------------------<  111      [12-24-19 @ 05:05]  4.2   |  22.0  |  0.81        Ca     9.9     [12-24-19 @ 05:05]      Troponin <0.01      [12-23-19 @ 18:05]        [12-23-19 @ 13:55]    Creatinine Trend:  SCr 0.81 [12-24 @ 05:05]  SCr 0.65 [12-23 @ 13:55]  SCr 0.79 [12-22 @ 07:49]  SCr 0.79 [12-21 @ 13:53]    Urinalysis - [12-23-19 @ 21:30]      Color Yellow / Appearance Clear / SG 1.010 / pH 7.0      Gluc Negative / Ketone Negative  / Bili Negative / Urobili Negative       Blood Negative / Protein Negative / Leuk Est Negative / Nitrite Negative      RBC  / WBC  / Hyaline  / Gran  / Sq Epi  / Non Sq Epi  / Bacteria     Urine Creatinine 74      [12-23-19 @ 21:29]  Urine Protein 9.0      [12-23-19 @ 21:30]  Urine Sodium 62      [12-23-19 @ 21:31]  Urine Osmolality 396      [12-23-19 @ 21:30]    PTH -- (Ca 10.9)      [12-23-19 @ 21:31]   54  Vitamin D (25OH) 22.6      [12-23-19 @ 21:31]  HbA1c 5.6      [12-23-19 @ 09:18]  Lipid: chol 207, , HDL 50,       [12-22-19 @ 07:49]    HCV 0.08, Nonreact      [12-22-19 @ 16:48]

## 2019-12-24 NOTE — OCCUPATIONAL THERAPY INITIAL EVALUATION ADULT - MANUAL MUSCLE TESTING RESULTS, REHAB EVAL
right shoulder 5/5, right elbow 5/5, right gross grasp 5/5; left UE with no trace movement in any planes 0/5

## 2019-12-24 NOTE — OCCUPATIONAL THERAPY INITIAL EVALUATION ADULT - NS ASR FOLLOW COMMAND OT EVAL
able to follow single-step instructions/100% of the time/pt requires increased time and repetition to process commands of tasks; pt requires cues to sequence and problem solve tasks/mobility

## 2019-12-24 NOTE — OCCUPATIONAL THERAPY INITIAL EVALUATION ADULT - ADDITIONAL COMMENTS
Pt reports living with family however environment is unclear.   Pt reports family assists with ADLs and transfers however amount of assistance is unclear.   Pt is right handed.

## 2019-12-24 NOTE — OCCUPATIONAL THERAPY INITIAL EVALUATION ADULT - RANGE OF MOTION EXAMINATION, UPPER EXTREMITY
left shoulder flexion PROM to 90 degrees, left elbow PROM WFL, left gross grasp and digit extension PROM WFL with adherence to tenodesis; pt with no AROM in any planes, pt with no shoulder shrugs AROM/Right UE Active ROM was WFL (within functional limits)

## 2019-12-24 NOTE — CHART NOTE - NSCHARTNOTEFT_GEN_A_CORE
Called by RN for /129. Spoke with Cardiology NP, Digna Rock. Gave patient Labetalol 10 mg IV x1. Also added standing order Labetalol 100 mg PO BID for SBP >150.     Patient asymptomatic at this time. Will continue to monitor.

## 2019-12-24 NOTE — OCCUPATIONAL THERAPY INITIAL EVALUATION ADULT - LEVEL OF INDEPENDENCE:TOILET, OT EVAL
+urination on toilet during session, pt able to hygiene self in sitting with right hand; assistance required with standing tasks/minimum assist (75% patients effort)

## 2019-12-24 NOTE — OCCUPATIONAL THERAPY INITIAL EVALUATION ADULT - PLANNED THERAPY INTERVENTIONS, OT EVAL
bed mobility training/motor coordination training/ADL retraining/balance training/toilet/transfer training/neuromuscular re-education/ROM/strengthening

## 2019-12-24 NOTE — OCCUPATIONAL THERAPY INITIAL EVALUATION ADULT - PERTINENT HX OF CURRENT PROBLEM, REHAB EVAL
Pt presents to ED with c/o slurred speech. Pt with history of left hemiparesis. MRI of head reveals acute to subacute infarct (see imaging for details).

## 2019-12-24 NOTE — PHYSICAL THERAPY INITIAL EVALUATION ADULT - ADDITIONAL COMMENTS
Pt. is a poor historian; states she needed assist with bed mobility, transfers and ambulation to bathroom with walker.

## 2019-12-24 NOTE — PROGRESS NOTE ADULT - ASSESSMENT
1) Hypercalcemia  2) MR brain with decreased signal in C3 verterbral body; Myeloma vs Lymphoma    Pt with hypercalcemia; Ca++ levels 10.9 on presentation now improving to 10.1  Upon chart review; Ca++ levels elevated at 10.9 in 2014, 11.1 in 11/2019  Hb normal; pt also with normal kidney funcion  Recommend to obtain iPTH, PTHrP, 1,25 (OH)2 Vitamin D and 25- OH vitamin D  UA bland; repeat UA and quantify TP/Cr ratio  Will extend to Myeloma work up based on above investigation  Will follow 1) Hypercalcemia  2) MR brain with decreased signal in C3 verterbral body; Myeloma vs Lymphoma    Pt with hypercalcemia; Ca++ levels 10.9 on presentation now improving to 9.9  Upon chart review; Ca++ levels elevated at 10.9 in 2014, 11.1 in 11/2019  Hb normal; pt also with normal kidney function-UA bland; no proteinuria  Nothing to suggest Myeloma at this time; may obtain serum free light chains  Dedicated imaging of cervical spine to further look into lesion described above  Obtain 24 hour urine and phosphorus  Will follow

## 2019-12-24 NOTE — PROGRESS NOTE ADULT - ASSESSMENT
74yoF hx prior CVA with residual left sided weakness, HTN presenting with left sided facial droop and slurred speech, being admitted for CVA work up, left hip pain after fall.    1-CVA acute subacute with old stroke   pt had MR of brain showing right temporal, parietal, centrum ovale CVA  cont aspirin ,statin   BP control, cardiology consulted- rec DANA on 12/26  neurology consulted, now signed off  PT eval appreciated- re    2-Uncontrolled HTN- cont to adjust meds for better control     3- left leg hip pain s/p fall at home- CT pelvis negative for fracture, pain control     4- bony lesion with hypercalcemia   seen on CT  Nephrology consult appreciated  Ca++ levels now improving  Ca++ levels were elevated back in 2014  MRI of c spine pending for further characterization bony lesion  Obtain 24 hour urine and phosphorus per nephro  Pt's daughter declined to consent for NM bone scan Stating "I know my mother does not have cancer". She agrees with plan for MRI     Dispo: Pt stable for downgrade to medical floor. Had long discussion with daughter. She does not want any studies performed with contrast. Daughter agrees with plan as above except where otherwise noted. Daughter supports plan for NAREN upon discharge. 74yoF hx prior CVA with residual left sided weakness, HTN presenting with left sided facial droop and slurred speech, being admitted for CVA work up, left hip pain after fall. Ct scan of pelvis done fracture ruled out     1-CVA acute subacute with old stroke   pt had MR of brain showing right temporal, parietal, centrum ovale CVA  cont aspirin ,statin   BP control  neurology consulted, now signed off  PT eval appreciated- re    2-Uncontrolled HTN- cont to adjust meds for better control   improving   3- left leg hip pain s/p fall at home- CT pelvis negative for fracture, pain control     4- bony lesion with hypercalcemia   seen on CT  Nephrology consult appreciated  Ca++ levels now improving  Ca++ levels were elevated back in 2014  MRI of c spine pending for further characterization bony lesion  Obtain 24 hour urine and phosphorus per nephro  Pt's daughter declined to consent for NM bone scan Stating "I know my mother does not have cancer". She agrees with plan for MRI     Dispo: Pt stable for downgrade to medical floor. Had long discussion with daughter. She does not want any studies performed with contrast. Daughter agrees with plan as above except where otherwise noted. Daughter supports plan for NAREN upon discharge.

## 2019-12-24 NOTE — OCCUPATIONAL THERAPY INITIAL EVALUATION ADULT - GENERAL OBSERVATIONS, REHAB EVAL
Received pt in semi-malin position in bed, +IV, +telemetry, +Primafit, +bed alarm (3rd position). Pt agrees to OT.

## 2019-12-24 NOTE — PROGRESS NOTE ADULT - SUBJECTIVE AND OBJECTIVE BOX
[facility]                                                        Office: [address], [place] [address]                                                       Telephone: [phone]. Fax:[phone]                                                                             PROGRESS NOTE   Reason for follow up:  cerebrovascular accident                             Overnight: No new events.   Update:  cerebrovascular accident     Subjective: "  i am ok "   Complains of:  no new symptoms.   Review of symptoms: Cardiac:  No chest pain. No dyspnea. No palpitations.  Respiratory:no cough. No dyspnea  Gastrointestinal: No diarrhea. No abdominal pain. No bleeding.     Past medical history: No updates.   Chronic conditions:  Hypertension: controlled.    Vitals:  T(C): 36.8 (12-24-19 @ 16:00), Max: 37.4 (12-23-19 @ 20:00)  HR: 88 (12-24-19 @ 16:31) (68 - 106)  BP: 157/129 (12-24-19 @ 16:31) (118/72 - 187/126)  RR: 15 (12-24-19 @ 16:31) (15 - 26)  SpO2: 100% (12-24-19 @ 16:31) (97% - 100%)  Wt(kg): --  I&O's Summary    23 Dec 2019 07:01  -  24 Dec 2019 07:00  --------------------------------------------------------  IN: 1690 mL / OUT: 2 mL / NET: 1688 mL          PHYSICAL EXAM:  Appearance: Comfortable. No acute distress  HEENT:  Head and neck: Atraumatic. Normocephalic.  Normal oral mucosa, PERRL   Neurologic: A & O x 2,  left hemiparesis.   Lymphatic: No cervical lymphadenopathy  Cardiovascular: Normal S1 S2, No murmur, rubs/gallops. No JVD, No edema  Respiratory: Lungs clear to auscultation  Gastrointestinal:  Soft, Non-tender, + BS  Lower Extremities: No edema  Psychiatry: Patient is calm. No agitation. Mood & affect appropriate  Skin: No rashes/ ecchymoses/cyanosis/ulcers visualized on the face, hands or feet.    CURRENT MEDICATIONS:  amLODIPine   Tablet 10 milliGRAM(s) Oral daily  labetalol 100 milliGRAM(s) Oral two times a day  losartan 100 milliGRAM(s) Oral daily    atorvastatin  aspirin  chewable  clopidogrel Tablet  ergocalciferol Drops  heparin  Injectable  lactated ringers.  lidocaine   Patch      LABS:	 	  CARDIAC MARKERS ( 23 Dec 2019 18:05 )  x     / <0.01 ng/mL / x     / x     / x      p-BNP 23 Dec 2019 18:05: x    , CARDIAC MARKERS ( 23 Dec 2019 13:55 )  x     / <0.01 ng/mL / 385 U/L / x     / 2.1 ng/mL  p-BNP 23 Dec 2019 13:55: x                                11.7   5.02  )-----------( 274      ( 24 Dec 2019 05:05 )             37.3     12-24    137  |  101  |  16.0  ----------------------------<  111  4.2   |  22.0  |  0.81    Ca    9.9      24 Dec 2019 05:05      proBNP:   Lipid Profile: Date: 12-22 @ 07:49  Total cholesterol 207; Direct LDL: 137; HDL: 50; Triglycerides:100    HgA1c: Hemoglobin A1C, Whole Blood: 5.6 %  TSH:     TELEMETRY: Reviewed  unremarkable   ECG:  Reviewed by me. 	< from: 12 Lead ECG (12.23.19 @ 13:27) >  Diagnosis Line Sinus rhythm with 1stdegree A-V block  Left atrial enlargement  Abnormal ECG    < end of copied text >      DIAGNOSTIC TESTING:  [ ] Echocardiogram:  Normal EF. .no cardiomebolic source on TTE.

## 2019-12-24 NOTE — PROGRESS NOTE ADULT - SUBJECTIVE AND OBJECTIVE BOX
TARI DICK Female 74y [id number]    Patient is a 74y old  Female who presents with a chief complaint of Facial droop, dysarthria (24 Dec 2019 08:44)      Subjective/objective:  Pt seen and examined at bedside, no over night event reported by night staff. Pt reports doing well and has no new complaints.    Review of system:  No fever, chills, nausea, vomiting, headache, dizziness, chest pain, SOB or palpitation.      PHYSICAL EXAM:    Vital Signs Last 24 Hrs  T(C): 36.9 (24 Dec 2019 07:15), Max: 37.4 (23 Dec 2019 20:00)  T(F): 98.5 (24 Dec 2019 07:15), Max: 99.3 (23 Dec 2019 20:00)  HR: 95 (24 Dec 2019 13:56) (68 - 106)  BP: 142/93 (24 Dec 2019 13:56) (118/72 - 187/126)  BP(mean): 102 (24 Dec 2019 04:00) (86 - 144)  RR: 20 (24 Dec 2019 13:56) (16 - 26)  SpO2: 100% (24 Dec 2019 13:56) (97% - 100%)    GENERAL: Pt lying comfortably, NAD.  ENMT: PERRL, +EOMI.  NECK: soft, Supple, No JVD,   CHEST/LUNG: Clear to auscultatation bilaterally; No wheezing.  HEART: S1S2+, Regular rate and rhythm; No murmurs.  ABDOMEN: Soft, Nontender, Nondistended; Bowel sounds present.  MUSCULOSKELETAL: Normal range of motion.  SKIN: No rashes or lesions.  NEURO: AAOX3, no focal deficits, no motor r sensory loss.  PSYCH: normal mood.          MEDICATIONS  (STANDING):  amLODIPine   Tablet 10 milliGRAM(s) Oral daily  aspirin  chewable 81 milliGRAM(s) Oral daily  atorvastatin 80 milliGRAM(s) Oral at bedtime  clopidogrel Tablet 75 milliGRAM(s) Oral daily  heparin  Injectable 5000 Unit(s) SubCutaneous every 8 hours  lactated ringers. 1000 milliLiter(s) (65 mL/Hr) IV Continuous <Continuous>  lidocaine   Patch 1 Patch Transdermal daily  losartan 100 milliGRAM(s) Oral daily    MEDICATIONS  (PRN):  acetaminophen   Tablet .. 650 milliGRAM(s) Oral every 6 hours PRN Temp greater or equal to 38C (100.4F), Mild Pain (1 - 3), Moderate Pain (4 - 6)  traMADol 25 milliGRAM(s) Oral every 6 hours PRN Moderate Pain (4 - 6)        Labs:  LABS:                        11.7   5.02  )-----------( 274      ( 24 Dec 2019 05:05 )             37.3     12-    137  |  101  |  16.0  ----------------------------<  111  4.2   |  22.0  |  0.81    Ca    9.9      24 Dec 2019 05:05            Urinalysis Basic - ( 23 Dec 2019 21:30 )    Color: Yellow / Appearance: Clear / S.010 / pH: x  Gluc: x / Ketone: Negative  / Bili: Negative / Urobili: Negative mg/dL   Blood: x / Protein: Negative mg/dL / Nitrite: Negative   Leuk Esterase: Negative / RBC: x / WBC x   Sq Epi: x / Non Sq Epi: x / Bacteria: x        CARDIAC MARKERS ( 23 Dec 2019 18:05 )  x     / <0.01 ng/mL / x     / x     / x      CARDIAC MARKERS ( 23 Dec 2019 13:55 )  x     / <0.01 ng/mL / 385 U/L / x     / 2.1 ng/mL TARI DICK Female 74y [id number]    Patient is a 74y old  Female who presents with a chief complaint of Facial droop, dysarthria (24 Dec 2019 08:44)      Subjective/objective:  Pt seen and examined at bedside, no over night event reported by night staff. Pt reports doing well and has no new complaints.    Review of system:  No fever, chills, nausea, vomiting, headache, dizziness, chest pain, SOB or palpitation.      PHYSICAL EXAM:    Vital Signs Last 24 Hrs  T(C): 36.9 (24 Dec 2019 07:15), Max: 37.4 (23 Dec 2019 20:00)  T(F): 98.5 (24 Dec 2019 07:15), Max: 99.3 (23 Dec 2019 20:00)  HR: 95 (24 Dec 2019 13:56) (68 - 106)  BP: 142/93 (24 Dec 2019 13:56) (118/72 - 187/126)  BP(mean): 102 (24 Dec 2019 04:00) (86 - 144)  RR: 20 (24 Dec 2019 13:56) (16 - 26)  SpO2: 100% (24 Dec 2019 13:56) (97% - 100%)    GENERAL: Pt lying comfortably, NAD.  ENMT: PERRL, +EOMI.  NECK: soft, Supple, No JVD,   CHEST/LUNG: Clear to auscultatation bilaterally; No wheezing.  HEART: S1S2+, Regular rate and rhythm; No murmurs.  ABDOMEN: Soft, Nontender, Nondistended; Bowel sounds present.  MUSCULOSKELETAL: Normal range of motion.  SKIN: No rashes or lesions            MEDICATIONS  (STANDING):  amLODIPine   Tablet 10 milliGRAM(s) Oral daily  aspirin  chewable 81 milliGRAM(s) Oral daily  atorvastatin 80 milliGRAM(s) Oral at bedtime  clopidogrel Tablet 75 milliGRAM(s) Oral daily  heparin  Injectable 5000 Unit(s) SubCutaneous every 8 hours  lactated ringers. 1000 milliLiter(s) (65 mL/Hr) IV Continuous <Continuous>  lidocaine   Patch 1 Patch Transdermal daily  losartan 100 milliGRAM(s) Oral daily    MEDICATIONS  (PRN):  acetaminophen   Tablet .. 650 milliGRAM(s) Oral every 6 hours PRN Temp greater or equal to 38C (100.4F), Mild Pain (1 - 3), Moderate Pain (4 - 6)  traMADol 25 milliGRAM(s) Oral every 6 hours PRN Moderate Pain (4 - 6)        Labs:  LABS:                        11.7   5.02  )-----------( 274      ( 24 Dec 2019 05:05 )             37.3     12-    137  |  101  |  16.0  ----------------------------<  111  4.2   |  22.0  |  0.81    Ca    9.9      24 Dec 2019 05:05            Urinalysis Basic - ( 23 Dec 2019 21:30 )    Color: Yellow / Appearance: Clear / S.010 / pH: x  Gluc: x / Ketone: Negative  / Bili: Negative / Urobili: Negative mg/dL   Blood: x / Protein: Negative mg/dL / Nitrite: Negative   Leuk Esterase: Negative / RBC: x / WBC x   Sq Epi: x / Non Sq Epi: x / Bacteria: x        CARDIAC MARKERS ( 23 Dec 2019 18:05 )  x     / <0.01 ng/mL / x     / x     / x      CARDIAC MARKERS ( 23 Dec 2019 13:55 )  x     / <0.01 ng/mL / 385 U/L / x     / 2.1 ng/mL

## 2019-12-24 NOTE — OCCUPATIONAL THERAPY INITIAL EVALUATION ADULT - MODIFIED CLINICAL TEST OF SENSORY INTEGRATION IN BALANCE TEST
static sitting edge of bed with standby assistance; dynamic sitting edge of bed initially with moderate assistance however pt with increased performance overtime requiring only contact guard assistance; static/dynamic standing with moderate assistance

## 2019-12-24 NOTE — PHYSICAL THERAPY INITIAL EVALUATION ADULT - IMPAIRED TRANSFERS: BED/CHAIR, REHAB EVAL
impaired balance/impaired coordination/impaired motor control/decreased strength/abnormal muscle tone

## 2019-12-24 NOTE — PROGRESS NOTE ADULT - ASSESSMENT
Pt is a 73 y/o female with medical history of HTN, prior CVA who presents to [facility] to left sided facial droop and slurred speech. Pt states with assistance of , she fell at home 2 days ago. When her daughter saw he she noticed he face was distorted so she was brought to the hospital. Upon admission, pt was found to have acute to subacute infarct, and intracranial stenosis of right internal carotid artery on MRI and CT imaging. Pt had DANA completed which showed EF 55-60% EF Grade I diastolic dysfunction.   Pt currently presents with significant left sided weakness. Upon assessment, pt also c/o of sternal throbbing chest pain, raidiating to back, 10/10. Pt states "she feels like her heart is not beating". Pain is not reproducible upon palpation. Pt Denies fever, chills, cough, phlegm production, shortness of breath, dyspnea on exertion, orthopnea, PND, edema, palpitations, irregular, fast heart beat, nausea, vomiting, melena, rectal bleed, hematuria, lightheadedness, dizziness, syncope, near syncope.       1. CVA  - CT- evidence of right temporal occipital infarct, right intracranial artery stenosis  -BGA-DA-45-60%, grade one diastolic dysfunction  -Less likely cryptogenic stroke   Intracranioal athersclerosis and stenosis.   No indication for DANA now.  as per neuro interventional mediccal managem,net. no indication for intervention  dual antiplatelet therapy .aspirin 81 , plavix , statins high intensity      2.Chest Pain   resolved. non ischemic ECG.       3. HTN   norvasc and labtolol. Cautions control for BOP.

## 2019-12-24 NOTE — OCCUPATIONAL THERAPY INITIAL EVALUATION ADULT - PHYSICAL ASSIST/NONPHYSICAL ASSIST:TOILET, OT EVAL
Agree with above.  Pt is a 39 yr old female presenting with 3 days of constant severe frontal headache assoc with nausea and dizziness.  Pain began overnight and she went to work and it gradually became severe. PT has also had elevated BP to 180's as outpt. Pt has no focal neurologic deficits.  Pt's father  of an aneurysm at 42.  No travel.  No sick contacts. Pt saw PCP today who sent her here to be 'checked out'.  Pt endorses mid nausea with these sxs.  Pt has never been imaged for an aneurysm.  Pt denies visual changes.  Well appearing.  Plan to attempt sx improvement and assess for aneurysm. Agree with above.  Pt is a 39 yr old female presenting with 3 days of constant severe frontal headache assoc with nausea and dizziness.  Pain began overnight and she went to work and it gradually became severe. PT has also had elevated BP to 180's as outpt. Pt has no focal neurologic deficits.  Pt's father  of an aneurysm at 42.  No travel.  No sick contacts. Pt saw PCP today who sent her here to be 'checked out'.  Pt endorses mid nausea with these sxs.  Pt has never been imaged for an aneurysm.  Pt denies visual changes.  Well appearing.  Plan to attempt sx improvement and assess for aneurysm.    Despite lack of neurologic deficits and gradual onset headache without concern for intracranial bleed at this time, nature of pt's headache and significant family hx of aneurysm warrants eval with CTA.  Pt's sxs attempted to be improved with migraine cocktail.  Pt stable for signout to incoming team pending imaging and reassessment after migraine cocktail. 1 person assist/nonverbal cues (demo/gestures)/verbal cues

## 2019-12-25 PROCEDURE — 99232 SBSQ HOSP IP/OBS MODERATE 35: CPT

## 2019-12-25 PROCEDURE — 99233 SBSQ HOSP IP/OBS HIGH 50: CPT

## 2019-12-25 RX ORDER — ERGOCALCIFEROL 1.25 MG/1
50000 CAPSULE ORAL
Refills: 0 | Status: DISCONTINUED | OUTPATIENT
Start: 2019-12-25 | End: 2019-12-26

## 2019-12-25 RX ADMIN — Medication 650 MILLIGRAM(S): at 16:03

## 2019-12-25 RX ADMIN — LOSARTAN POTASSIUM 100 MILLIGRAM(S): 100 TABLET, FILM COATED ORAL at 06:51

## 2019-12-25 RX ADMIN — LIDOCAINE 1 PATCH: 4 CREAM TOPICAL at 19:31

## 2019-12-25 RX ADMIN — HEPARIN SODIUM 5000 UNIT(S): 5000 INJECTION INTRAVENOUS; SUBCUTANEOUS at 06:45

## 2019-12-25 RX ADMIN — Medication 100 MILLIGRAM(S): at 06:51

## 2019-12-25 RX ADMIN — Medication 81 MILLIGRAM(S): at 08:51

## 2019-12-25 RX ADMIN — LIDOCAINE 1 PATCH: 4 CREAM TOPICAL at 08:51

## 2019-12-25 RX ADMIN — SODIUM CHLORIDE 65 MILLILITER(S): 9 INJECTION, SOLUTION INTRAVENOUS at 08:52

## 2019-12-25 RX ADMIN — Medication 100 MILLIGRAM(S): at 17:04

## 2019-12-25 RX ADMIN — ATORVASTATIN CALCIUM 80 MILLIGRAM(S): 80 TABLET, FILM COATED ORAL at 21:28

## 2019-12-25 RX ADMIN — AMLODIPINE BESYLATE 10 MILLIGRAM(S): 2.5 TABLET ORAL at 06:45

## 2019-12-25 RX ADMIN — HEPARIN SODIUM 5000 UNIT(S): 5000 INJECTION INTRAVENOUS; SUBCUTANEOUS at 21:31

## 2019-12-25 RX ADMIN — LIDOCAINE 1 PATCH: 4 CREAM TOPICAL at 21:32

## 2019-12-25 RX ADMIN — HEPARIN SODIUM 5000 UNIT(S): 5000 INJECTION INTRAVENOUS; SUBCUTANEOUS at 14:26

## 2019-12-25 RX ADMIN — CLOPIDOGREL BISULFATE 75 MILLIGRAM(S): 75 TABLET, FILM COATED ORAL at 08:51

## 2019-12-25 RX ADMIN — ERGOCALCIFEROL 2000 UNIT(S): 1.25 CAPSULE ORAL at 13:56

## 2019-12-25 NOTE — PROGRESS NOTE ADULT - ASSESSMENT
1) Hypercalcemia  2) MR brain with decreased signal in C3 verterbral body; Myeloma vs Lymphoma    Pt with chronic hypercalcemia; Ca++ levels 10.9 on presentation now improved  Hb normal; pt also with normal kidney function-UA bland; no proteinuria  Nothing to suggest Myeloma at this time; may obtain serum free light chains, 24 h urine calcium and phos  Dedicated imaging of cervical spine to further look into lesion described above if consistent with GOC  Will follow prn

## 2019-12-25 NOTE — PROGRESS NOTE ADULT - SUBJECTIVE AND OBJECTIVE BOX
Internal Medicine Hospitalist Progress Note    follow up for stroke   pt is seen examined , no distress , she denies any pain       Vital Signs Last 24 Hrs  T(C): 36.7 (25 Dec 2019 10:09), Max: 36.8 (24 Dec 2019 16:00)  T(F): 98 (25 Dec 2019 10:09), Max: 98.3 (24 Dec 2019 16:00)  HR: 76 (25 Dec 2019 10:09) (63 - 88)  BP: 129/82 (25 Dec 2019 10:09) (127/85 - 157/129)  BP(mean): 108 (24 Dec 2019 20:00) (108 - 108)  RR: 16 (25 Dec 2019 10:09) (14 - 16)  SpO2: 100% (25 Dec 2019 10:09) (98% - 100%)    Constitutional: awake alert , no distress     Respiratory: CTA bilateral     Cardiovascular: regular s1 /s2     Gastrointestinal: soft , BS + no tenderness     Extremities: no pretibial edema     Neurological: left sided weakness, normal speech                                                11.7   5.02  )-----------( 274      ( 24 Dec 2019 05:05 )             37.3   12-24    137  |  101  |  16.0  ----------------------------<  111  4.2   |  22.0  |  0.81    Ca    9.9      24 Dec 2019 05:05 Internal Medicine Hospitalist Progress Note    follow up for stroke   pt is seen examined , no distress , she denies any pain       Vital Signs Last 24 Hrs  T(C): 36.9 (25 Dec 2019 15:47), Max: 36.9 (25 Dec 2019 15:47)  T(F): 98.5 (25 Dec 2019 15:47), Max: 98.5 (25 Dec 2019 15:47)  HR: 83 (25 Dec 2019 15:47) (63 - 83)  BP: 130/85 (25 Dec 2019 15:47) (127/85 - 151/89)  BP(mean): 108 (24 Dec 2019 20:00) (108 - 108)  RR: 16 (25 Dec 2019 15:47) (14 - 16)  SpO2: 99% (25 Dec 2019 15:47) (98% - 100%)    Constitutional: awake alert , no distress     Respiratory: CTA bilateral     Cardiovascular: regular s1 /s2     Gastrointestinal: soft , BS + no tenderness     Extremities: no pretibial edema     Neurological: left sided weakness, normal speech                                                11.7   5.02  )-----------( 274      ( 24 Dec 2019 05:05 )             37.3   12-24    137  |  101  |  16.0  ----------------------------<  111  4.2   |  22.0  |  0.81    Ca    9.9      24 Dec 2019 05:05

## 2019-12-25 NOTE — PROGRESS NOTE ADULT - SUBJECTIVE AND OBJECTIVE BOX
[facility] DIVISION OF KIDNEY DISEASES AND HYPERTENSION -- FOLLOW UP NOTE  --------------------------------------------------------------------------------  Chief Complaint: hypercalcemia    24 hour events/subjective:  No acute event  Pt seen and examined; feels well    PAST HISTORY  --------------------------------------------------------------------------------  No significant changes to PMH, PSH, FHx, SHx, unless otherwise noted    ALLERGIES & MEDICATIONS  --------------------------------------------------------------------------------  Allergies    No Known Allergies    Intolerances      Standing Inpatient Medications  amLODIPine   Tablet 10 milliGRAM(s) Oral daily  aspirin  chewable 81 milliGRAM(s) Oral daily  atorvastatin 80 milliGRAM(s) Oral at bedtime  clopidogrel Tablet 75 milliGRAM(s) Oral daily  ergocalciferol 00875 Unit(s) Oral every week  ergocalciferol Drops 2000 Unit(s) Oral daily  heparin  Injectable 5000 Unit(s) SubCutaneous every 8 hours  labetalol 100 milliGRAM(s) Oral two times a day  lactated ringers. 1000 milliLiter(s) IV Continuous <Continuous>  lidocaine   Patch 1 Patch Transdermal daily  losartan 100 milliGRAM(s) Oral daily    PRN Inpatient Medications  acetaminophen   Tablet .. 650 milliGRAM(s) Oral every 6 hours PRN  traMADol 25 milliGRAM(s) Oral every 6 hours PRN      REVIEW OF SYSTEMS  --------------------------------------------------------------------------------  Gen: No weight changes, fatigue, fevers/chills, weakness  Skin: No rashes  Head/Eyes/Ears/Mouth: No headache; Normal hearing; Normal vision w/o blurriness; No sinus pain/discomfort, sore throat  Respiratory: No dyspnea, cough, wheezing, hemoptysis  CV: No chest pain, PND, orthopnea  GI: No abdominal pain, diarrhea, constipation, nausea, vomiting, melena, hematochezia  : No increased frequency, dysuria, hematuria, nocturia  MSK: No joint pain/swelling; no back pain; no edema  Neuro: No dizziness/lightheadedness, weakness, seizures, numbness, tingling  Heme: No easy bruising or bleeding  Endo: No heat/cold intolerance  Psych: No significant nervousness, anxiety, stress, depression    All other systems were reviewed and are negative, except as noted.    VITALS/PHYSICAL EXAM  --------------------------------------------------------------------------------  T(C): 36.9 (12-25-19 @ 15:47), Max: 36.9 (12-25-19 @ 15:47)  HR: 94 (12-25-19 @ 17:03) (63 - 94)  BP: 165/90 (12-25-19 @ 17:03) (127/85 - 165/90)  RR: 16 (12-25-19 @ 15:47) (14 - 16)  SpO2: 99% (12-25-19 @ 15:47) (98% - 100%)  Wt(kg): --        12-24-19 @ 07:01  -  12-25-19 @ 07:00  --------------------------------------------------------  IN: 1420 mL / OUT: 200 mL / NET: 1220 mL      Physical Exam:  	Gen: NAD, well-appearing  	HEENT: supple neck  	Pulm: CTA B/L  	CV: RRR, S1S2; no rub  	Back: No spinal or CVA tenderness; no sacral edema  	Abd: +BS, soft, nontender/nondistended  	: No suprapubic tenderness  	UE: Warm,  no edema; no asterixis  	LE: Warm, ; no edema  	Neuro: No focal deficits  	Psych: Normal affect and mood  	Skin: Warm, without rashes      LABS/STUDIES  --------------------------------------------------------------------------------              11.7   5.02  >-----------<  274      [12-24-19 @ 05:05]              37.3     137  |  101  |  16.0  ----------------------------<  111      [12-24-19 @ 05:05]  4.2   |  22.0  |  0.81        Ca     9.9     [12-24-19 @ 05:05]    Creatinine Trend:  SCr 0.81 [12-24 @ 05:05]  SCr 0.65 [12-23 @ 13:55]  SCr 0.79 [12-22 @ 07:49]  SCr 0.79 [12-21 @ 13:53]    Urinalysis - [12-23-19 @ 21:30]      Color Yellow / Appearance Clear / SG 1.010 / pH 7.0      Gluc Negative / Ketone Negative  / Bili Negative / Urobili Negative       Blood Negative / Protein Negative / Leuk Est Negative / Nitrite Negative      RBC  / WBC  / Hyaline  / Gran  / Sq Epi  / Non Sq Epi  / Bacteria     Urine Creatinine 74      [12-23-19 @ 21:29]  Urine Protein 9.0      [12-23-19 @ 21:30]  Urine Sodium 62      [12-23-19 @ 21:31]  Urine Osmolality 396      [12-23-19 @ 21:30]    PTH -- (Ca 10.9)      [12-23-19 @ 21:31]   54  Vitamin D (25OH) 22.6      [12-23-19 @ 21:31]  HbA1c 5.6      [12-23-19 @ 09:18]  Lipid: chol 207, , HDL 50,       [12-22-19 @ 07:49]    HCV 0.08, Nonreact      [12-22-19 @ 16:48]

## 2019-12-25 NOTE — PROGRESS NOTE ADULT - ASSESSMENT
74yoF hx prior CVA with residual left sided weakness, HTN presenting with left sided facial droop and slurred speech, being admitted for CVA work up, left hip pain after fall xray not clear CT scan done fracture ruled out     1- CVA acute subacute with old stroke   pt had MR of brain positive  cont aspirin ,statin   BP control, cardiology consulted     neurology follow up appreciated     2-Uncontrolled HTN- cont to adjust meds for better control     3- left leg hip pain s/p fall   fracture is ruled out       4- bone lesion with hypercalcemia   r/o malignancy however daughter refuses for any work up bone scan further work up   MR of c spine ordered   nephrology fallow up appreciated     discharge planing home vs [facility]

## 2019-12-26 PROCEDURE — 99233 SBSQ HOSP IP/OBS HIGH 50: CPT

## 2019-12-26 PROCEDURE — 99232 SBSQ HOSP IP/OBS MODERATE 35: CPT

## 2019-12-26 PROCEDURE — 99223 1ST HOSP IP/OBS HIGH 75: CPT

## 2019-12-26 RX ORDER — ERGOCALCIFEROL 1.25 MG/1
2000 CAPSULE ORAL DAILY
Refills: 0 | Status: DISCONTINUED | OUTPATIENT
Start: 2019-12-26 | End: 2019-12-27

## 2019-12-26 RX ORDER — LABETALOL HCL 100 MG
100 TABLET ORAL THREE TIMES A DAY
Refills: 0 | Status: DISCONTINUED | OUTPATIENT
Start: 2019-12-26 | End: 2019-12-27

## 2019-12-26 RX ADMIN — LOSARTAN POTASSIUM 100 MILLIGRAM(S): 100 TABLET, FILM COATED ORAL at 05:26

## 2019-12-26 RX ADMIN — Medication 81 MILLIGRAM(S): at 13:15

## 2019-12-26 RX ADMIN — HEPARIN SODIUM 5000 UNIT(S): 5000 INJECTION INTRAVENOUS; SUBCUTANEOUS at 05:27

## 2019-12-26 RX ADMIN — CLOPIDOGREL BISULFATE 75 MILLIGRAM(S): 75 TABLET, FILM COATED ORAL at 13:15

## 2019-12-26 RX ADMIN — HEPARIN SODIUM 5000 UNIT(S): 5000 INJECTION INTRAVENOUS; SUBCUTANEOUS at 21:12

## 2019-12-26 RX ADMIN — Medication 100 MILLIGRAM(S): at 21:13

## 2019-12-26 RX ADMIN — ERGOCALCIFEROL 2000 UNIT(S): 1.25 CAPSULE ORAL at 13:16

## 2019-12-26 RX ADMIN — LIDOCAINE 1 PATCH: 4 CREAM TOPICAL at 19:15

## 2019-12-26 RX ADMIN — AMLODIPINE BESYLATE 10 MILLIGRAM(S): 2.5 TABLET ORAL at 05:26

## 2019-12-26 RX ADMIN — HEPARIN SODIUM 5000 UNIT(S): 5000 INJECTION INTRAVENOUS; SUBCUTANEOUS at 13:16

## 2019-12-26 RX ADMIN — Medication 100 MILLIGRAM(S): at 05:26

## 2019-12-26 RX ADMIN — LIDOCAINE 1 PATCH: 4 CREAM TOPICAL at 13:13

## 2019-12-26 RX ADMIN — ATORVASTATIN CALCIUM 80 MILLIGRAM(S): 80 TABLET, FILM COATED ORAL at 21:12

## 2019-12-26 NOTE — PROGRESS NOTE ADULT - PROBLEM SELECTOR PLAN 2
Discussed with Dr. Boyle- daughter, Faye refuses bone scan, agreeable to MRI.  Daughter has been made aware of possible malignancy

## 2019-12-26 NOTE — PROGRESS NOTE ADULT - SUBJECTIVE AND OBJECTIVE BOX
[facility] DIVISION OF KIDNEY DISEASES AND HYPERTENSION -- FOLLOW UP NOTE  --------------------------------------------------------------------------------  Chief Complaint: hypercalcemia    24 hour events/subjective:  No acute events  Pt has no new complaint        PAST HISTORY  --------------------------------------------------------------------------------  No significant changes to PMH, PSH, FHx, SHx, unless otherwise noted    ALLERGIES & MEDICATIONS  --------------------------------------------------------------------------------  Allergies    No Known Allergies    Intolerances      Standing Inpatient Medications  amLODIPine   Tablet 10 milliGRAM(s) Oral daily  aspirin  chewable 81 milliGRAM(s) Oral daily  atorvastatin 80 milliGRAM(s) Oral at bedtime  clopidogrel Tablet 75 milliGRAM(s) Oral daily  ergocalciferol 57235 Unit(s) Oral every week  ergocalciferol Drops 2000 Unit(s) Oral daily  heparin  Injectable 5000 Unit(s) SubCutaneous every 8 hours  labetalol 100 milliGRAM(s) Oral two times a day  lidocaine   Patch 1 Patch Transdermal daily  losartan 100 milliGRAM(s) Oral daily    PRN Inpatient Medications  acetaminophen   Tablet .. 650 milliGRAM(s) Oral every 6 hours PRN  traMADol 25 milliGRAM(s) Oral every 6 hours PRN      REVIEW OF SYSTEMS  --------------------------------------------------------------------------------  Gen: No weight changes, fatigue, fevers/chills, weakness  Skin: No rashes  Head/Eyes/Ears/Mouth: No headache; Normal hearing; Normal vision w/o blurriness  Respiratory: No dyspnea, cough, wheezing, hemoptysis  CV: No chest pain, PND, orthopnea  GI: No abdominal pain, diarrhea, constipation, nausea, vomiting, melena  : No increased frequency, dysuria, hematuria, nocturia  MSK: No joint pain/swelling; no back pain; no edema  Neuro: No dizziness/lightheadedness, weakness, seizures  Heme: No easy bruising or bleeding  Endo: No heat/cold intolerance  Psych: No significant nervousness, anxiety, stress, depression    All other systems were reviewed and are negative, except as noted.    VITALS/PHYSICAL EXAM  --------------------------------------------------------------------------------  T(C): 36.9 (12-26-19 @ 08:00), Max: 36.9 (12-25-19 @ 15:47)  HR: 68 (12-26-19 @ 11:02) (68 - 94)  BP: 160/99 (12-26-19 @ 11:02) (110/59 - 170/90)  RR: 18 (12-26-19 @ 11:02) (16 - 18)  SpO2: 94% (12-26-19 @ 11:02) (94% - 99%)  Wt(kg): --        Physical Exam:  	Gen: NAD,  	HEENT: supple neck  	Pulm: CTA B/L  	CV: RRR, S1S2; no rub  	Back: No spinal or CVA tenderness; no sacral edema  	Abd: +BS, soft, nontender/nondistended  	: No suprapubic tenderness  	UE: Warm,  no edema; no asterixis  	LE: Warm, ; no edema  	Neuro: No focal deficits  	Psych: calm  	Skin: Warm, without rashes      LABS/STUDIES  --------------------------------------------------------------------------------      Creatinine Trend:  SCr 0.81 [12-24 @ 05:05]  SCr 0.65 [12-23 @ 13:55]  SCr 0.79 [12-22 @ 07:49]  SCr 0.79 [12-21 @ 13:53]    Urinalysis - [12-23-19 @ 21:30]      Color Yellow / Appearance Clear / SG 1.010 / pH 7.0      Gluc Negative / Ketone Negative  / Bili Negative / Urobili Negative       Blood Negative / Protein Negative / Leuk Est Negative / Nitrite Negative      RBC  / WBC  / Hyaline  / Gran  / Sq Epi  / Non Sq Epi  / Bacteria     Urine Creatinine 74      [12-23-19 @ 21:29]  Urine Protein 9.0      [12-23-19 @ 21:30]  Urine Sodium 62      [12-23-19 @ 21:31]  Urine Osmolality 396      [12-23-19 @ 21:30]    PTH -- (Ca 10.9)      [12-23-19 @ 21:31]   54  Vitamin D (25OH) 22.6      [12-23-19 @ 21:31]  HbA1c 5.6      [12-23-19 @ 09:18]  Lipid: chol 207, , HDL 50,       [12-22-19 @ 07:49]    HCV 0.08, Nonreact      [12-22-19 @ 16:48]

## 2019-12-26 NOTE — CONSULT NOTE ADULT - SUBJECTIVE AND OBJECTIVE BOX
HPI:  74yoF hx prior CVA with residual left sided weakness, HTN presenting on 12/21 with left sided facial droop and slurred speech.  Pt [place] speaking, knows limited English, so hx obtained from chart review and verbal sign out from tele-hospitalist who spoke with pt daughter Faye and pt grandson Mark.  Only granddaughter Sheryl currently at bedside and can provide limited hx.  Pt has baseline left sided weakness from recent CVA she had 1 month ago (granddaughter reports she was at [facility] during CVA, however no record in EMR.  Pt unable to move her left arm due to contracture after stroke and ambulates with a walker.  Earlier this AM, they noticed that she had a left sided facial droop and her speech was not clear.  Per granddaughter at bedside, pt still having slurred speech when she talks.  Pt also had recent fall with impact to her left side and currently endorses left hip pain to me.     On admission, CT head shows re-demonstration of areas of hypodensity within the right frontal lobe, concerning for acute/subacute infarct, chronic right temporal occipital infarct with possible superimposed age-indeterminate infarct and stable left medial frontal lobe hypodensity.  CT cervical spine showed numerous round luncencies daughter refused bone scan workup, CT pelvis ordered to r/o fracture, seen by neurology Acute infarct in right temporal and parietal lobes,     Patient continues to have left hip pain, at rest and with movement.       REVIEW OF SYSTEMS  Constitutional - No fever, No weight loss, No fatigue  HEENT - No eye pain, No visual disturbances, No difficulty hearing, No tinnitus, No vertigo, No neck pain  Respiratory - No cough, No wheezing, No shortness of breath  Cardiovascular - No chest pain, No palpitations  Gastrointestinal - No abdominal pain, No nausea, No vomiting, No diarrhea, No constipation  Genitourinary - No dysuria, No frequency, No hematuria, No incontinence  Neurological - No headaches, No memory loss, + loss of strength  Musculoskeletal - +left hip pain  Psychiatric - No depression, No anxiety    VITALS  T(C): 36.9 (12-26-19 @ 08:00), Max: 36.9 (12-25-19 @ 15:47)  HR: 68 (12-26-19 @ 11:02) (68 - 94)  BP: 160/99 (12-26-19 @ 11:02) (110/59 - 170/90)  RR: 18 (12-26-19 @ 11:02) (16 - 18)  SpO2: 94% (12-26-19 @ 11:02) (94% - 99%)  Wt(kg): --    PAST MEDICAL & SURGICAL HISTORY  Paralysis of left upper extremity  CVA (cerebral vascular accident)  No significant past surgical history      SOCIAL HISTORY  Smoking - Denied  EtOH - Denied   Drugs - Denied    FUNCTIONAL HISTORY  Lives with family, required assist with ADLs/ AMB PTA, used RW      CURRENT FUNCTIONAL STATUS  12/24  ·Bed Mobility: Sit to Supine:     · Level of [place]	moderate assist (50% patients effort)  · Physical Assist/Nonphysical Assist	2 person assist; verbal cues  · Assistive Device	bed rails    Bed Mobility: Supine to Sit:     · Level of [place]	moderate assist (50% patients effort)  · Physical Assist/Nonphysical Assist	2 person assist; verbal cues  · Assistive Device	bed rails    Transfer: Bed to Chair:     Transfer Skill: Bed to Chair   · Level of [place]	moderate assist (50% patients effort)  · Physical Assist/Nonphysical Assist	1 person assist; verbal cues  · Assistive Device	stand pivot    Transfer: Chair to Bed:     · Level of [place]	moderate assist (50% patients effort)  · Physical Assist/Nonphysical Assist	1 person assist; verbal cues  · Assistive Device	stand pivot    Bed/Chair Transfer Safety Analysis:     · Impairments Contributing to Impaired Transfers	impaired balance; impaired coordination; impaired motor control; abnormal muscle tone; decreased strength  · Transfer Safety Concerns Noted	decreased balance during turns; decreased safety awareness; losing balance    Transfer: Sit to Stand:     · Level of [place]	moderate assist (50% patients effort)  · Physical Assist/Nonphysical Assist	verbal cues; 1 person assist    Transfer: Stand to Sit:     · Level of [place]	moderate assist (50% patients effort)  · Physical Assist/Nonphysical Assist	1 person assist; verbal cues    Sit/Stand Transfer Safety Analysis:     · Transfer Safety Concerns Noted	decreased safety awareness; losing balance  · Impairments Contributing to Impaired Transfers	impaired balance; cognition; impaired motor control; abnormal muscle tone; decreased strength    Gait Skills:     · Level of [place]	unable to perform; LE weakness    Bed Mobility: Rolling/Turning:     · Level of [place]	moderate assist (50% patients effort)  · Physical Assist/Nonphysical Assist	2 person assist; verbal cues; nonverbal cues (demo/gestures)    Bed Mobility: Scooting/Bridging:     · Level of [place]	moderate assist (50% patients effort); scooting  · Physical Assist/Nonphysical Assist	2 person assist; verbal cues; nonverbal cues (demo/gestures)    Bed Mobility: Sit to Supine:     · Level of [place]	moderate assist (50% patients effort)  · Physical Assist/Nonphysical Assist	2 person assist; verbal cues; nonverbal cues (demo/gestures)    Bed Mobility: Supine to Sit:     · Level of [place]	moderate assist (50% patients effort)  · Physical Assist/Nonphysical Assist	2 person assist; verbal cues; nonverbal cues (demo/gestures)    Transfer: Sit to Stand:     · Level of [place]	moderate assist (50% patients effort)  · Physical Assist/Nonphysical Assist	verbal cues; nonverbal cues (demo/gestures); 1 person assist  · Weight-Bearing Restrictions	weight-bearing as tolerated    Transfer: Stand to Sit:     · Level of [place]	moderate assist (50% patients effort)  · Physical Assist/Nonphysical Assist	1 person assist; nonverbal cues (demo/gestures); verbal cues  · Weight-Bearing Restrictions	weight-bearing as tolerated    Transfer: Toilet Transfer:     · Level of [place]	moderate assist (50% patients effort)  · Physical Assist/Nonphysical Assist	1 person assist; nonverbal cues (demo/gestures); verbal cues  · Weight-Bearing Restrictions	weight-bearing as tolerated  · Assistive Device	SPT to/from bedside commode      FAMILY HISTORY   No pertinent family history in first degree relatives      RECENT LABS/IMAGING    IMPRESSION:   12/21  Noncontrast CT Head: Redemonstration of areas of hypodensity within the right frontal lobe, concerning for acute/subacute infarct. Chronic right temporal occipital infarct with possible superimposed age-indeterminate infarct. Stable left medial frontal lobe hypodensity. No acute intracranial hemorrhage or significant mass effect.    CT cervical spine: No acute cervical spinal fracture or evidence of traumatic malalignment. Numerous small rounded lucencies scattered throughout the visualized spine. Further evaluation with nonemergent MRI and/or bone scan is recommended as findings may be seen in the setting of underlying malignancy.    CTA Neck: No flow-limiting stenosis or occlusion within the cervical carotid or vertebral arteries.     CTA Head: Intracranial atherosclerosis contributing to severe stenosis of the right supraclinoid internal carotid artery. Additional areas of scattered intracranial luminal stenoses, including the mid basilar segment, which demonstrates mild luminal stenosis.      < end of copied text >      Brain MRI reported < from: MR Head No Cont (12.22.19 @ 10:30) >  Area of encephalomalacia and gliosis is seen involving the right posterior temporal/occipital region. This compatible with an old infarct. There are scattered areasof T2 pronation with restricted diffusion seen involving the right posterior temporal, superior frontal parietal and right centrum semiovale region. These findings are compatible with areas of acute to subacute infarcts. No hemorrhagic transformationis seen. No significant shift or herniation is seen.    The large vessels demonstrate normal flow voids    The visualized paranasal sinuses mastoid and middle ear regions appear clear.    Abnormal decreased signal seen involving the C3 vertebral body. This is seen posteriorly. Please note the possibility of an underlying neoplastic lesion such as metastasis myeloma or lymphoma cannot be entirely excluded. Dedicated imaging of the cervical spine can be done for further evaluation.    Impression:    Acute to subacute infarct as described above.    < end of copied text >      Carotid duplex reported < from: US Duplex Carotid Arteries Complete, Bilateral (12.22.19 @ 09:53) >    IMPRESSION:     1.  The ultrasound tech notes that the study is limited due to suboptimal patient cooperation. The study was also limited due to a high bifurcation of the bilateral common carotid arteries.  Please see recently performed CT angiogram of the neck from 12/21/2019.     2.  No hemodynamically significant stenosis is seen bilaterally.    Atherosclerotic burden, as above.                   ALLERGIES  No Known Allergies      MEDICATIONS   acetaminophen   Tablet .. 650 milliGRAM(s) Oral every 6 hours PRN  amLODIPine   Tablet 10 milliGRAM(s) Oral daily  aspirin  chewable 81 milliGRAM(s) Oral daily  atorvastatin 80 milliGRAM(s) Oral at bedtime  clopidogrel Tablet 75 milliGRAM(s) Oral daily  ergocalciferol 52216 Unit(s) Oral every week  ergocalciferol Drops 2000 Unit(s) Oral daily  heparin  Injectable 5000 Unit(s) SubCutaneous every 8 hours  labetalol 100 milliGRAM(s) Oral two times a day  lidocaine   Patch 1 Patch Transdermal daily  losartan 100 milliGRAM(s) Oral daily  traMADol 25 milliGRAM(s) Oral every 6 hours PRN      ----------------------------------------------------------------------------------------  PHYSICAL EXAM  Constitutional - NAD, Comfortable  HEENT - NCAT, EOMI  Neck - Supple, No limited ROM  Chest - Breathing comfortably, No wheezing  Cardiovascular - well perfused   Abdomen - Soft   Extremities - No C/C/E, No calf tenderness   Neurologic Exam -                    Cognitive - Awake, Alert, AAO to self, place, date, year, situation (as per nurses aide)     Communication - Fluent, No dysarthria     Cranial Nerves - CN 2-12 intact     Motor - Left UE spastic hemiplegia,                    RIGHT UE - ShAB 5/5, EF 5/5, EE 5/5, WE 5/5,  5/5                    LEFT    LE - HF 3/5, KE 3/5, DF 5/5, PF 5/5                    RIGHT LE - HF 5/5, KE 5/5, DF 5/5, PF 5/5        Sensory - Intact to LT     Reflexes - DTR Intact, No primitive reflexive     Psychiatric - Mood stable, Affect WNL  ----------------------------------------------------------------------------------------  ASSESSMENT/PLAN  74yFemale with h/o CVA, left spastic hemiplegia, with functional deficits after acute CVA, infarcts in right temporal and parietal lobes, left hip pain  Pain - Tylenol  DVT PPX - SCDs, heparin  Rehab -  Recommend [facility], patient DOES NOT meet acute inpatient rehabilitation criteria   Given the severity of patient's deficits, she would benefit from cognitive therapy, safety assessments, reconditioning, ADL and mobility restoration in a supervised setting.   continue bedside PT/OT: Bed mobility, transfers, Amb with AD, ADLs.

## 2019-12-26 NOTE — PROGRESS NOTE ADULT - PROBLEM SELECTOR PLAN 4
Daughter agreeable to MRI, not bone scan.  Met with patient and daughter Faye.  Daughter saddened by mother's condition and weakness.   [facility] was introduced to them by hospitalist. Offered my assistance in completion.  Daughter wishes to first review it.   Daughter agreeable to rehab. She understands mother weak on left side, but hope at the very least can walk independently.

## 2019-12-26 NOTE — GOALS OF CARE CONVERSATION - ADVANCED CARE PLANNING - CONVERSATION DETAILS
as above , pt 's current condition diagnoses , all tests result , treatment  plan   MOLTS advance directives discussed , daughter willing all medical support   full code   MOLTS form given to her to review   all questions answered

## 2019-12-26 NOTE — PROGRESS NOTE ADULT - ASSESSMENT
74yoF hx prior CVA with residual left sided weakness, HTN presenting with left sided facial droop and slurred speech, being admitted for CVA work up, left hip pain after fall CT scan done fracture ruled out , hypercalcemia is improving , incidental findings with bone lesion , zion refused bone scan     1- CVA acute subacute with old stroke   pt had MR of brain positive  cont aspirin ,statin   BP control, cardiology consulted     2-Uncontrolled HTN- cont to adjust meds for better control     3- left leg hip pain s/p fall   fracture is ruled out     4- bone lesion with hypercalcemia   r/o malignancy however daughter refuses for any work up bone scan further work up   MR of c spine ordered   will discuss with daughter for goal of care and further testing re bone lesion   over all prognosis poor     discharge home with daughter soon

## 2019-12-26 NOTE — PROGRESS NOTE ADULT - SUBJECTIVE AND OBJECTIVE BOX
CC:  follow up GOC  INTERVAL HPI/OVERNIGHT EVENTS:    PRESENT SYMPTOMS: SOURCE:  Patient/Family/Team    PAIN SCALE:  0 = none  1 = mild   2 = moderate  3 = severe    Pain: denies    Dyspnea:  [ ] YES [ x] NO  Anxiety:  [ ] YES [ x] NO  Fatigue: [x ] YES [ ] NO  Nausea: [ ] YES [x ] NO  Loss of Appetite: [ x] YES [ ] NO  Other symptoms: __________    MEDICATIONS  (STANDING):  amLODIPine   Tablet 10 milliGRAM(s) Oral daily  aspirin  chewable 81 milliGRAM(s) Oral daily  atorvastatin 80 milliGRAM(s) Oral at bedtime  clopidogrel Tablet 75 milliGRAM(s) Oral daily  ergocalciferol 70687 Unit(s) Oral every week  ergocalciferol Drops 2000 Unit(s) Oral daily  heparin  Injectable 5000 Unit(s) SubCutaneous every 8 hours  labetalol 100 milliGRAM(s) Oral two times a day  lidocaine   Patch 1 Patch Transdermal daily  losartan 100 milliGRAM(s) Oral daily    MEDICATIONS  (PRN):  acetaminophen   Tablet .. 650 milliGRAM(s) Oral every 6 hours PRN Temp greater or equal to 38C (100.4F), Mild Pain (1 - 3), Moderate Pain (4 - 6)  traMADol 25 milliGRAM(s) Oral every 6 hours PRN Moderate Pain (4 - 6)      Allergies    No Known Allergies    Intolerances  Karnofsky Performance Score/Palliative Performance Status Version 2:  50  %    Vital Signs Last 24 Hrs  T(C): 36.9 (26 Dec 2019 08:00), Max: 36.9 (25 Dec 2019 15:47)  T(F): 98.4 (26 Dec 2019 08:00), Max: 98.5 (25 Dec 2019 15:47)  HR: 68 (26 Dec 2019 11:02) (68 - 94)  BP: 160/99 (26 Dec 2019 11:02) (110/59 - 170/90)  BP(mean): --  RR: 18 (26 Dec 2019 11:02) (16 - 18)  SpO2: 94% (26 Dec 2019 11:02) (94% - 99%)    PHYSICAL EXAM:    General: awake alert NAD  HEENT: [x normal  [ ] dry mouth  [ ] ET tube/trach    Lungs: [ x] comfortable [ ] tachypnea/labored breathing  [ ] excessive secretions    CV: [ x] normal  [ ] tachycardia    GI: [ x] normal  [ ] distended  [ ] tender  [ ] no BS               [ ] PEG/NG/OG tube    : [x ] normal  [ ] incontinent  [ ] oliguria/anuria  [ ] carrillo    MSK: [ ] normal  [x ] weakness  [ ] edema             [ ] ambulatory  [ ] bedbound/wheelchair bound    Skin: [ ] normal  [ ] pressure ulcers- Stage_____  [ x] no rash    < from: CT Pelvis No Cont (12.23.19 @ 20:26) >     EXAM:  CT PELVIS ONLY                          PROCEDURE DATE:  12/23/2019          INTERPRETATION:  [facility] RADIOLOGIST PRELIMINARY REPORT  Reviewed by KODY Foley M.D.  PROCEDURE INFORMATION:   Exam: CT Pelvis Without Contrast; Skeletal   Exam dateand time: 12/23/2019 8:15 PM   Age: 74 years old   Clinical indication: Pain; Other: R/O FX     TECHNIQUE:   Imaging protocol: Computed tomography images of the pelvis without contrast.   Exam focused on the skeletal structures.   3D rendering: MIP and/or 3D reconstructed images were created by the   technologist.     COMPARISON:   CR XR HIP WITH PELVIS 2 OR 3 VIEWS LEFT 12/22/2019 6:09 AM     FINDINGS:   Reproductive: Multi-fibroid uterus.   Bones/joints: No fracture or subluxation.   Soft tissues: Moderate sized lipoma measuring 4 cm involving the left iliac is   seen the regional left inguinal region.     IMPRESSION:   1. No fracture or subluxation of pelvis or bilateral hips.   2. Multi-fibroid uterus.      < end of copied text >    Thank you for the opportunity to assist with the care of this patient.   [facility] [phone].

## 2019-12-26 NOTE — PROGRESS NOTE ADULT - ASSESSMENT
Pt is a 75 y/o female with medical history of HTN, prior CVA who presents to [facility] to left sided facial droop and slurred speech. Pt states with assistance of , she fell at home 2 days ago. When her daughter saw he she noticed he face was distorted so she was brought to the hospital. Upon admission, pt was found to have acute to subacute infarct, and intracranial stenosis of right internal carotid artery on MRI and CT imaging. Pt had DANA completed which showed EF 55-60% EF Grade I diastolic dysfunction.   Pt currently presents with significant left sided weakness. Upon assessment, pt also c/o of sternal throbbing chest pain, raidiating to back, 10/10. Pt states "she feels like her heart is not beating". Pain is not reproducible upon palpation. Pt Denies fever, chills, cough, phlegm production, shortness of breath, dyspnea on exertion, orthopnea, PND, edema, palpitations, irregular, fast heart beat, nausea, vomiting, melena, rectal bleed, hematuria, lightheadedness, dizziness, syncope, near syncope.       1. CVA  -  Right temporal occipital infarct, right intracranial artery stenosis  -   -Less likely cryptogenic stroke   Intracranioal athersclerosis and stenosis.   No indication for DANA now.    dual antiplatelet therapy .aspirin 81 , plavix , statins high intensity      2.Chest Pain   resolved. non ischemic ECG.       3. HTN   norvasc and labtolol. Cautions control for BOP.

## 2019-12-26 NOTE — PROGRESS NOTE ADULT - SUBJECTIVE AND OBJECTIVE BOX
[place] [facility]                                                        Office: [address] [address]                                                       Telephone: [phone]. Fax:[phone]                                                                             PROGRESS NOTE   Reason for follow up:  cerebrovascular accident                             Overnight: No new events.   Update:  cerebrovascular accident     Subjective: "  i am  fine"   Complains of:  no new symptoms.   Review of symptoms: Cardiac:  No chest pain. No dyspnea. No palpitations.  Respiratory:no cough. No dyspnea  Gastrointestinal: No diarrhea. No abdominal pain. No bleeding.     Past medical history: No updates.   Chronic conditions:  Hypertension: controlled.    Vitals:  Vital Signs Last 24 Hrs  T(C): 36.9 (12-26-19 @ 08:00), Max: 36.9 (12-25-19 @ 15:47)  T(F): 98.4 (12-26-19 @ 08:00), Max: 98.5 (12-25-19 @ 15:47)  HR: 68 (12-26-19 @ 11:02) (68 - 94)  BP: 160/99 (12-26-19 @ 11:02) (110/59 - 170/90)  BP(mean): --  RR: 18 (12-26-19 @ 11:02) (16 - 18)  SpO2: 94% (12-26-19 @ 11:02) (94% - 99%)T 	      PHYSICAL EXAM:  Appearance: Comfortable. No acute distress  HEENT:  Head and neck: Atraumatic. Normocephalic.  Normal oral mucosa, PERRL   Neurologic: A & O x 2,  left hemiparesis.   Lymphatic: No cervical lymphadenopathy  Cardiovascular: Normal S1 S2, No murmur, rubs/gallops. No JVD, No edema  Respiratory: Lungs clear to auscultation  Gastrointestinal:  Soft, Non-tender, + BS  Lower Extremities: No edema  Psychiatry: Patient is calm. No agitation. Mood & affect appropriate  Skin: No rashes/ ecchymoses/cyanosis/ulcers visualized on the face, hands or feet.       lidocaine   Patch  MEDICATIONS  (STANDING):  amLODIPine   Tablet 10 milliGRAM(s) Oral daily  labetalol 100 milliGRAM(s) Oral two times a day  losartan 100 milliGRAM(s) Oral daily    aspirin  chewable  atorvastatin  clopidogrel Tablet  ergocalciferol  ergocalciferol Drops  heparin  Injectable  lidocaine   Patch    PRN: acetaminophen   Tablet .. PRN  traMADol PRN      LABS:	 	  CARDIAC MARKERS ( 23 Dec 2019 18:05 )  x     / <0.01 ng/mL / x     / x     / x      p-BNP 23 Dec 2019 18:05: x    , CARDIAC MARKERS ( 23 Dec 2019 13:55 )  x     / <0.01 ng/mL / 385 U/L / x     / 2.1 ng/mL  p-BNP 23 Dec 2019 13:55: x                  proBNP:   Lipid Profile: Date: 12-22 @ 07:49  Total cholesterol 207; Direct LDL: 137; HDL: 50; Triglycerides:100    HgA1c: Hemoglobin A1C, Whole Blood: 5.6 %  TSH:     TELEMETRY: Reviewed  unremarkable   ECG:  Reviewed by me. 	< from: 12 Lead ECG (12.23.19 @ 13:27) >  Diagnosis Line Sinus rhythm with 1stdegree A-V block  Left atrial enlargement  Abnormal ECG    < end of copied text >      DIAGNOSTIC TESTING:  [ ] Echocardiogram:  Normal EF. .no cardiomebolic source on TTE.

## 2019-12-26 NOTE — CONSULT NOTE ADULT - REASON FOR ADMISSION
Facial droop, dysarthria
Stroke
Facial droop, dysarthria

## 2019-12-26 NOTE — PROGRESS NOTE ADULT - SUBJECTIVE AND OBJECTIVE BOX
Internal Medicine Hospitalist Progress Note    follow up for stroke , uncontrolled HTN   pt is feeling well , no complaints   BP is labile, pt is with left sided weakness     Vital Signs Last 24 Hrs  T(C): 36.9 (26 Dec 2019 08:00), Max: 36.9 (25 Dec 2019 15:47)  T(F): 98.4 (26 Dec 2019 08:00), Max: 98.5 (25 Dec 2019 15:47)  HR: 92 (26 Dec 2019 08:00) (70 - 94)  BP: 128/83 (26 Dec 2019 08:00) (110/59 - 170/90)  BP(mean): --  RR: 18 (26 Dec 2019 08:00) (16 - 18)  SpO2: 97% (26 Dec 2019 08:00) (97% - 99%)    General : awake alert , no distress    Respiratory: CTA bilateral     Cardiovascular: regular s1 /s2     Gastrointestinal: soft , BS + no tenderness     Extremities: no pretibial edema     Neurological: left sided weakness, normal speech

## 2019-12-26 NOTE — PROGRESS NOTE ADULT - ASSESSMENT
1) Hypercalcemia  2) MR brain with decreased signal in C3 verterbral body; Myeloma vs Lymphoma    Pt with chronic hypercalcemia; Ca++ levels 10.9 on presentation improved on last labs (12/24)   Hb normal; pt also with normal kidney function-UA bland; no proteinuria  Nothing to suggest Myeloma at this time; may obtain serum free light chains, 24 h urine calcium and phos  Dedicated imaging of cervical spine to further look into lesion described above if consistent with [facility]  nephrology signing off; please reconsult prn

## 2019-12-27 ENCOUNTER — TRANSCRIPTION ENCOUNTER (OUTPATIENT)
Age: 74
End: 2019-12-27

## 2019-12-27 VITALS
HEART RATE: 78 BPM | SYSTOLIC BLOOD PRESSURE: 132 MMHG | TEMPERATURE: 98 F | DIASTOLIC BLOOD PRESSURE: 82 MMHG | RESPIRATION RATE: 18 BRPM | OXYGEN SATURATION: 96 %

## 2019-12-27 DIAGNOSIS — M89.9 DISORDER OF BONE, UNSPECIFIED: ICD-10-CM

## 2019-12-27 PROCEDURE — 83935 ASSAY OF URINE OSMOLALITY: CPT

## 2019-12-27 PROCEDURE — 83970 ASSAY OF PARATHORMONE: CPT

## 2019-12-27 PROCEDURE — 99233 SBSQ HOSP IP/OBS HIGH 50: CPT

## 2019-12-27 PROCEDURE — 97530 THERAPEUTIC ACTIVITIES: CPT

## 2019-12-27 PROCEDURE — 97167 OT EVAL HIGH COMPLEX 60 MIN: CPT

## 2019-12-27 PROCEDURE — 84484 ASSAY OF TROPONIN QUANT: CPT

## 2019-12-27 PROCEDURE — 84156 ASSAY OF PROTEIN URINE: CPT

## 2019-12-27 PROCEDURE — 72125 CT NECK SPINE W/O DYE: CPT

## 2019-12-27 PROCEDURE — 73502 X-RAY EXAM HIP UNI 2-3 VIEWS: CPT

## 2019-12-27 PROCEDURE — 96374 THER/PROPH/DIAG INJ IV PUSH: CPT | Mod: XU

## 2019-12-27 PROCEDURE — 93308 TTE F-UP OR LMTD: CPT

## 2019-12-27 PROCEDURE — 82570 ASSAY OF URINE CREATININE: CPT

## 2019-12-27 PROCEDURE — 99239 HOSP IP/OBS DSCHRG MGMT >30: CPT

## 2019-12-27 PROCEDURE — 84300 ASSAY OF URINE SODIUM: CPT

## 2019-12-27 PROCEDURE — 82550 ASSAY OF CK (CPK): CPT

## 2019-12-27 PROCEDURE — 93306 TTE W/DOPPLER COMPLETE: CPT

## 2019-12-27 PROCEDURE — 81001 URINALYSIS AUTO W/SCOPE: CPT

## 2019-12-27 PROCEDURE — 83519 RIA NONANTIBODY: CPT

## 2019-12-27 PROCEDURE — 72192 CT PELVIS W/O DYE: CPT

## 2019-12-27 PROCEDURE — 36415 COLL VENOUS BLD VENIPUNCTURE: CPT

## 2019-12-27 PROCEDURE — 80061 LIPID PANEL: CPT

## 2019-12-27 PROCEDURE — 97163 PT EVAL HIGH COMPLEX 45 MIN: CPT

## 2019-12-27 PROCEDURE — 72141 MRI NECK SPINE W/O DYE: CPT

## 2019-12-27 PROCEDURE — 99285 EMERGENCY DEPT VISIT HI MDM: CPT | Mod: 25

## 2019-12-27 PROCEDURE — 71045 X-RAY EXAM CHEST 1 VIEW: CPT

## 2019-12-27 PROCEDURE — 93880 EXTRACRANIAL BILAT STUDY: CPT

## 2019-12-27 PROCEDURE — 70551 MRI BRAIN STEM W/O DYE: CPT

## 2019-12-27 PROCEDURE — 82553 CREATINE MB FRACTION: CPT

## 2019-12-27 PROCEDURE — 85610 PROTHROMBIN TIME: CPT

## 2019-12-27 PROCEDURE — 86803 HEPATITIS C AB TEST: CPT

## 2019-12-27 PROCEDURE — 83036 HEMOGLOBIN GLYCOSYLATED A1C: CPT

## 2019-12-27 PROCEDURE — 97535 SELF CARE MNGMENT TRAINING: CPT

## 2019-12-27 PROCEDURE — 97116 GAIT TRAINING THERAPY: CPT

## 2019-12-27 PROCEDURE — 85730 THROMBOPLASTIN TIME PARTIAL: CPT

## 2019-12-27 PROCEDURE — 83735 ASSAY OF MAGNESIUM: CPT

## 2019-12-27 PROCEDURE — 72141 MRI NECK SPINE W/O DYE: CPT | Mod: 26

## 2019-12-27 PROCEDURE — 80048 BASIC METABOLIC PNL TOTAL CA: CPT

## 2019-12-27 PROCEDURE — 81003 URINALYSIS AUTO W/O SCOPE: CPT

## 2019-12-27 PROCEDURE — 70498 CT ANGIOGRAPHY NECK: CPT

## 2019-12-27 PROCEDURE — 70496 CT ANGIOGRAPHY HEAD: CPT

## 2019-12-27 PROCEDURE — 85027 COMPLETE CBC AUTOMATED: CPT

## 2019-12-27 PROCEDURE — 82340 ASSAY OF CALCIUM IN URINE: CPT

## 2019-12-27 PROCEDURE — 80053 COMPREHEN METABOLIC PANEL: CPT

## 2019-12-27 PROCEDURE — 93005 ELECTROCARDIOGRAM TRACING: CPT

## 2019-12-27 PROCEDURE — 70450 CT HEAD/BRAIN W/O DYE: CPT

## 2019-12-27 PROCEDURE — 82652 VIT D 1 25-DIHYDROXY: CPT

## 2019-12-27 PROCEDURE — 82310 ASSAY OF CALCIUM: CPT

## 2019-12-27 PROCEDURE — 82306 VITAMIN D 25 HYDROXY: CPT

## 2019-12-27 RX ORDER — HEPARIN SODIUM 5000 [USP'U]/ML
5000 INJECTION INTRAVENOUS; SUBCUTANEOUS
Qty: 0 | Refills: 0 | DISCHARGE
Start: 2019-12-27

## 2019-12-27 RX ORDER — LIDOCAINE 4 G/100G
1 CREAM TOPICAL
Qty: 0 | Refills: 0 | DISCHARGE
Start: 2019-12-27

## 2019-12-27 RX ORDER — LABETALOL HCL 100 MG
1 TABLET ORAL
Qty: 0 | Refills: 0 | DISCHARGE
Start: 2019-12-27

## 2019-12-27 RX ORDER — ASPIRIN/CALCIUM CARB/MAGNESIUM 324 MG
1 TABLET ORAL
Qty: 0 | Refills: 0 | DISCHARGE

## 2019-12-27 RX ORDER — ATORVASTATIN CALCIUM 80 MG/1
1 TABLET, FILM COATED ORAL
Qty: 0 | Refills: 0 | DISCHARGE
Start: 2019-12-27

## 2019-12-27 RX ORDER — ASPIRIN/CALCIUM CARB/MAGNESIUM 324 MG
1 TABLET ORAL
Qty: 0 | Refills: 0 | DISCHARGE
Start: 2019-12-27

## 2019-12-27 RX ORDER — AMLODIPINE BESYLATE 2.5 MG/1
1 TABLET ORAL
Qty: 0 | Refills: 0 | DISCHARGE
Start: 2019-12-27

## 2019-12-27 RX ORDER — ERGOCALCIFEROL 1.25 MG/1
2000 CAPSULE ORAL
Qty: 0 | Refills: 0 | DISCHARGE
Start: 2019-12-27

## 2019-12-27 RX ORDER — TRAMADOL HYDROCHLORIDE 50 MG/1
0.5 TABLET ORAL
Qty: 0 | Refills: 0 | DISCHARGE
Start: 2019-12-27

## 2019-12-27 RX ORDER — ACETAMINOPHEN 500 MG
2 TABLET ORAL
Qty: 0 | Refills: 0 | DISCHARGE
Start: 2019-12-27

## 2019-12-27 RX ORDER — CLOPIDOGREL BISULFATE 75 MG/1
1 TABLET, FILM COATED ORAL
Qty: 0 | Refills: 0 | DISCHARGE
Start: 2019-12-27

## 2019-12-27 RX ADMIN — Medication 100 MILLIGRAM(S): at 05:12

## 2019-12-27 RX ADMIN — AMLODIPINE BESYLATE 10 MILLIGRAM(S): 2.5 TABLET ORAL at 05:12

## 2019-12-27 RX ADMIN — LIDOCAINE 1 PATCH: 4 CREAM TOPICAL at 00:18

## 2019-12-27 RX ADMIN — LOSARTAN POTASSIUM 100 MILLIGRAM(S): 100 TABLET, FILM COATED ORAL at 05:12

## 2019-12-27 RX ADMIN — HEPARIN SODIUM 5000 UNIT(S): 5000 INJECTION INTRAVENOUS; SUBCUTANEOUS at 14:00

## 2019-12-27 RX ADMIN — HEPARIN SODIUM 5000 UNIT(S): 5000 INJECTION INTRAVENOUS; SUBCUTANEOUS at 05:12

## 2019-12-27 RX ADMIN — LIDOCAINE 1 PATCH: 4 CREAM TOPICAL at 14:01

## 2019-12-27 RX ADMIN — Medication 100 MILLIGRAM(S): at 14:00

## 2019-12-27 RX ADMIN — CLOPIDOGREL BISULFATE 75 MILLIGRAM(S): 75 TABLET, FILM COATED ORAL at 14:00

## 2019-12-27 RX ADMIN — Medication 81 MILLIGRAM(S): at 14:00

## 2019-12-27 NOTE — DISCHARGE NOTE PROVIDER - NSDCCPCAREPLAN_GEN_ALL_CORE_FT
PRINCIPAL DISCHARGE DIAGNOSIS  Diagnosis: Stroke  Assessment and Plan of Treatment: Additional acute right infarcts as per MRi  Severe right supraclinoid ICA intracranial stenosis  Continue ASA, Plavix, high dose statin  Follow up with Neurology 1-2 weeks, sooner if needed  Follow up with Cardiology 1-2 weeks, sooner if needed  Optimize BP control      SECONDARY DISCHARGE DIAGNOSES  Diagnosis: Cerebral atherosclerosis  Assessment and Plan of Treatment: severe intracranial atherosclerosis.   Continue  dual antiplatelet therapy for the near term.   As per Neurovascular  Radiology ,  intracranial stenting is not warranted unless patient fails maximal medical management.   Recommend a trial of medical management first. If patient has any new symptoms would then consider an intracranial angioplasty/possible stenting    Diagnosis: Hypercalcemia  Assessment and Plan of Treatment: Pt with chronic hypercalcemia; Ca++ levels 10.9 on presentation improved on last labs (12/24)   Hb normal; pt also with normal kidney function-UA bland; no proteinuria  Nothing to suggest Myeloma at this time  Follow up with Nephrology 1-2 weeks, sooner if needed

## 2019-12-27 NOTE — DISCHARGE NOTE PROVIDER - NSDCFUADDAPPT_GEN_ALL_CORE_FT
Neurology Appt: Friday 1/10/20 at 1:00 PM with Dr Dumont. [address] [place] [address]. [phone].    Pt has no concern about receiving or taking her medications.

## 2019-12-27 NOTE — DISCHARGE NOTE PROVIDER - NSDCMRMEDTOKEN_GEN_ALL_CORE_FT
acetaminophen 325 mg oral tablet: 2 tab(s) orally every 6 hours, As needed, Temp greater or equal to 38C (100.4F), Mild Pain (1 - 3), Moderate Pain (4 - 6)  amLODIPine 10 mg oral tablet: 1 tab(s) orally once a day  aspirin 81 mg oral tablet, chewable: 1 tab(s) orally once a day  atorvastatin 80 mg oral tablet: 1 tab(s) orally once a day (at bedtime)  clopidogrel 75 mg oral tablet: 1 tab(s) orally once a day  ergocalciferol 8000 intl units/mL oral solution: 2000 unit(s) orally once a day  heparin: 5000 unit(s) subcutaneous every 8 hours  labetalol 100 mg oral tablet: 1 tab(s) orally 3 times a day  lidocaine 5% topical film: Apply topically to affected area once a day  losartan 100 mg oral tablet: 1 tab(s) orally once a day  traMADol 50 mg oral tablet: 0.5 tab(s) orally every 6 hours, As needed, Moderate Pain (4 - 6) acetaminophen 325 mg oral tablet: 2 tab(s) orally every 6 hours, As needed, Temp greater or equal to 38C (100.4F), Mild Pain (1 - 3), Moderate Pain (4 - 6)  amLODIPine 10 mg oral tablet: 1 tab(s) orally once a day  aspirin 81 mg oral tablet, chewable: 1 tab(s) orally once a day  atorvastatin 80 mg oral tablet: 1 tab(s) orally once a day (at bedtime)  clopidogrel 75 mg oral tablet: 1 tab(s) orally once a day  ergocalciferol 8000 intl units/mL oral solution: 2000 unit(s) orally once a day  labetalol 100 mg oral tablet: 1 tab(s) orally 3 times a day  lidocaine 5% topical film: Apply topically to affected area once a day  losartan 100 mg oral tablet: 1 tab(s) orally once a day  traMADol 50 mg oral tablet: 0.5 tab(s) orally every 6 hours, As needed, Moderate Pain (4 - 6)

## 2019-12-27 NOTE — PROGRESS NOTE ADULT - ASSESSMENT
Pt is a 73 y/o female with medical history of HTN, prior CVA who presents to [facility] to left sided facial droop and slurred speech. Pt states with assistance of , she fell at home 2 days ago. When her daughter saw he she noticed he face was distorted so she was brought to the hospital. Upon admission, pt was found to have acute to subacute infarct, and intracranial stenosis of right internal carotid artery on MRI and CT imaging. Pt had DANA completed which showed EF 55-60% EF Grade I diastolic dysfunction.   Pt currently presents with significant left sided weakness. Upon assessment, pt also c/o of sternal throbbing chest pain, raidiating to back, 10/10. Pt states "she feels like her heart is not beating". Pain is not reproducible upon palpation. Pt Denies fever, chills, cough, phlegm production, shortness of breath, dyspnea on exertion, orthopnea, PND, edema, palpitations, irregular, fast heart beat, nausea, vomiting, melena, rectal bleed, hematuria, lightheadedness, dizziness, syncope, near syncope.       1. CVA  -  Right temporal occipital infarct, right intracranial artery stenosis  -Less likely cryptogenic stroke   Intracranioal athersclerosis and stenosis.   No indication for DANA now.    dual antiplatelet therapy .aspirin 81 , plavix , statins high intensity      2.Chest Pain   resolved. non ischemic ECG.       3. HTN   norvasc and labtolol.    No further in-patient cardiac work-up/management is needed.  Follow-up in cardiology office in 2 weeks.

## 2019-12-27 NOTE — PROGRESS NOTE ADULT - SUBJECTIVE AND OBJECTIVE BOX
[place] [facility]                                                        Office: [address] [address]                                                       Telephone: [phone]. Fax:[phone]                                                                             PROGRESS NOTE   Reason for follow up:  cerebrovascular accident                             Overnight: No new events.   Update:  cerebrovascular accident     Subjective: "  i am ok "   Complains of:  no new symptoms.   Review of symptoms: Cardiac:  No chest pain. No dyspnea. No palpitations.  Respiratory:no cough. No dyspnea  Gastrointestinal: No diarrhea. No abdominal pain. No bleeding.     Past medical history: No updates.   Chronic conditions:  Hypertension: controlled.     Vital Signs Last 24 Hrs  T(C): 36.7 (12-27-19 @ 08:00), Max: 36.8 (12-26-19 @ 15:31)  T(F): 98 (12-27-19 @ 08:00), Max: 98.3 (12-26-19 @ 15:31)  HR: 78 (12-27-19 @ 08:00) (61 - 96)  BP: 132/82 (12-27-19 @ 08:00) (117/80 - 160/90)  BP(mean): --  RR: 18 (12-27-19 @ 08:00) (18 - 18)  SpO2: 96% (12-27-19 @ 08:00) (93% - 99%)      PHYSICAL EXAM:  Appearance: Comfortable. No acute distress  HEENT:  Head and neck: Atraumatic. Normocephalic.  Normal oral mucosa, PERRL   Neurologic: A & O x 2,  left hemiparesis.   Lymphatic: No cervical lymphadenopathy  Cardiovascular: Normal S1 S2, No murmur, rubs/gallops. No JVD, No edema  Respiratory: Lungs clear to auscultation  Gastrointestinal:  Soft, Non-tender, + BS  Lower Extremities: No edema  Psychiatry: Patient is calm. No agitation. Mood & affect appropriate  Skin: No rashes/ ecchymoses/cyanosis/ulcers visualized on the face, hands or feet.    MEDICATIONS  (STANDING):  amLODIPine   Tablet 10 milliGRAM(s) Oral daily  labetalol 100 milliGRAM(s) Oral three times a day  losartan 100 milliGRAM(s) Oral daily    aspirin  chewable  atorvastatin  clopidogrel Tablet  ergocalciferol Drops  heparin  Injectable  lidocaine   Patch    PRN: acetaminophen   Tablet .. PRN  traMADol PRN                 proBNP:   Lipid Profile: Date: 12-22 @ 07:49  Total cholesterol 207; Direct LDL: 137; HDL: 50; Triglycerides:100    HgA1c: Hemoglobin A1C, Whole Blood: 5.6 %  TSH:     TELEMETRY: Reviewed  unremarkable   ECG:  Reviewed by me. 	< from: 12 Lead ECG (12.23.19 @ 13:27) >  Diagnosis Line Sinus rhythm with 1stdegree A-V block  Left atrial enlargement  Abnormal ECG    < end of copied text >      DIAGNOSTIC TESTING:  [ ] Echocardiogram:  Normal EF. .no cardiomebolic source on TTE.

## 2019-12-27 NOTE — PROGRESS NOTE ADULT - ATTENDING COMMENTS
No further in-patient cardiac work-up/management is needed.  Follow-up in cardiology office in 2 weeks.
pt is seen in am   she is with pain in the hip leg   no overnight events

## 2019-12-27 NOTE — DISCHARGE NOTE PROVIDER - CARE PROVIDERS DIRECT ADDRESSES
,DirectAddress_Unknown,[email or web address],DirectAddress_Unknown,[email or web address]

## 2019-12-27 NOTE — DISCHARGE NOTE PROVIDER - NSDCPNSUBOBJ_GEN_ALL_CORE
Patient is a 74y old  Female who presents with a chief complaint of Facial droop, dysarthria (27 Dec 2019 11:08)        Patient seen and examined at bedside.         ALLERGIES:    No Known Allergies        MEDICATIONS  (STANDING):    amLODIPine   Tablet 10 milliGRAM(s) Oral daily    aspirin  chewable 81 milliGRAM(s) Oral daily    atorvastatin 80 milliGRAM(s) Oral at bedtime    clopidogrel Tablet 75 milliGRAM(s) Oral daily    ergocalciferol Drops 2000 Unit(s) Oral daily    heparin  Injectable 5000 Unit(s) SubCutaneous every 8 hours    labetalol 100 milliGRAM(s) Oral three times a day    lidocaine   Patch 1 Patch Transdermal daily    losartan 100 milliGRAM(s) Oral daily        MEDICATIONS  (PRN):    acetaminophen   Tablet .. 650 milliGRAM(s) Oral every 6 hours PRN Temp greater or equal to 38C (100.4F), Mild Pain (1 - 3), Moderate Pain (4 - 6)    traMADol 25 milliGRAM(s) Oral every 6 hours PRN Moderate Pain (4 - 6)        Vital Signs Last 24 Hrs    T(F): 98 (27 Dec 2019 08:00), Max: 98.3 (26 Dec 2019 15:31)    HR: 78 (27 Dec 2019 08:00) (61 - 96)    BP: 132/82 (27 Dec 2019 08:00) (117/80 - 160/90)    RR: 18 (27 Dec 2019 08:00) (18 - 18)    SpO2: 96% (27 Dec 2019 08:00) (93% - 99%)    I&O's Summary        26 Dec 2019 07:01  -  27 Dec 2019 07:00    --------------------------------------------------------    IN: 450 mL / OUT: 0 mL / NET: 450 mL        27 Dec 2019 07:01  -  27 Dec 2019 14:44    --------------------------------------------------------    IN: 0 mL / OUT: 600 mL / NET: -600 mL            PHYSICAL EXAM:    General: NAD    ENT: MMM, no thrush    Neck: Supple, No JVD    Lungs: Clear to auscultation bilaterally, good air entry, non-labored breathing    Cardio: +S1/S2    Abdomen: Soft, Nontender, Nondistended; Bowel sounds present    Extremities: No calf tenderness        LABS:        12-22 Chol 207 mg/dL  mg/dL HDL 50 mg/dL Trig 100 mg/dL        12-23 [id number] 5.6        RADIOLOGY & ADDITIONAL TESTS:    No new tests        Care Discussed with Consultants/Other Providers:         Assessment and Plan     74yoF hx prior CVA with residual left sided weakness, HTN presenting with left sided facial droop and slurred speech, being admitted for CVA work up, left hip pain after fall CT scan done fracture ruled out , hypercalcemia is improving , incidental findings with bone lesion , daughter refused bone scan         #CVA acute subacute with old stroke     - pt had MR of brain positive    - cont aspirin ,statin     - cardiology consult appreciated        #HTN    - monitor blood pressure     - amlodipine, labetolol, losartan        #bone lesion with hypercalcemia    - d/w Daughter on phone as patient did not tolerate MRI d/c understands patient could have cancer - does not want another try at MRI or does not want bone scan will follow up with pmd outpatient         #DVT prophylaxis    - venodynes Patient is a 74y old  Female who presents with a chief complaint of Facial droop, dysarthria (27 Dec 2019 11:08)        Patient seen and examined at bedside.         ALLERGIES:    No Known Allergies        MEDICATIONS  (STANDING):    amLODIPine   Tablet 10 milliGRAM(s) Oral daily    aspirin  chewable 81 milliGRAM(s) Oral daily    atorvastatin 80 milliGRAM(s) Oral at bedtime    clopidogrel Tablet 75 milliGRAM(s) Oral daily    ergocalciferol Drops 2000 Unit(s) Oral daily    heparin  Injectable 5000 Unit(s) SubCutaneous every 8 hours    labetalol 100 milliGRAM(s) Oral three times a day    lidocaine   Patch 1 Patch Transdermal daily    losartan 100 milliGRAM(s) Oral daily        MEDICATIONS  (PRN):    acetaminophen   Tablet .. 650 milliGRAM(s) Oral every 6 hours PRN Temp greater or equal to 38C (100.4F), Mild Pain (1 - 3), Moderate Pain (4 - 6)    traMADol 25 milliGRAM(s) Oral every 6 hours PRN Moderate Pain (4 - 6)        Vital Signs Last 24 Hrs    T(F): 98 (27 Dec 2019 08:00), Max: 98.3 (26 Dec 2019 15:31)    HR: 78 (27 Dec 2019 08:00) (61 - 96)    BP: 132/82 (27 Dec 2019 08:00) (117/80 - 160/90)    RR: 18 (27 Dec 2019 08:00) (18 - 18)    SpO2: 96% (27 Dec 2019 08:00) (93% - 99%)    I&O's Summary        26 Dec 2019 07:01  -  27 Dec 2019 07:00    --------------------------------------------------------    IN: 450 mL / OUT: 0 mL / NET: 450 mL        27 Dec 2019 07:01  -  27 Dec 2019 14:44    --------------------------------------------------------    IN: 0 mL / OUT: 600 mL / NET: -600 mL            PHYSICAL EXAM:    General: NAD    ENT: MMM, no thrush    Neck: Supple, No JVD    Lungs: Clear to auscultation bilaterally, good air entry, non-labored breathing    Cardio: +S1/S2    Abdomen: Soft, Nontender, Nondistended; Bowel sounds present    Extremities: No calf tenderness        LABS:        12-22 Chol 207 mg/dL  mg/dL HDL 50 mg/dL Trig 100 mg/dL        12-23 [id number] 5.6        RADIOLOGY & ADDITIONAL TESTS:    No new tests        Care Discussed with Consultants/Other Providers:         Assessment and Plan     74yoF hx prior CVA with residual left sided weakness, HTN presenting with left sided facial droop and slurred speech, being admitted for CVA work up, left hip pain after fall CT scan done fracture ruled out , hypercalcemia is improving , incidental findings with bone lesion , daughter refused bone scan         #CVA acute subacute with old stroke     - pt had MR of brain positive    - cont aspirin ,statin     - cardiology consult appreciated        #HTN    - monitor blood pressure     - amlodipine, labetolol, losartan        #bone lesion with hypercalcemia    - d/w Daughter on phone as patient did not tolerate MRI d/c understands patient could have cancer - does not want another try at MRI or does not want bone scan will follow up with pmd outpatient (advised this can lead to worsening of condition, death, infection, or further strokes if due to cancer vs other pathology)         #DVT prophylaxis    - venodynes

## 2019-12-27 NOTE — DISCHARGE NOTE PROVIDER - HOSPITAL COURSE
74yoF hx prior CVA with residual left sided weakness, HTN presenting with left sided facial droop and slurred speech, admitted for CVA work up, left hip pain after fall CT scan done fracture ruled out . The patient  had MR of brain showing right temporal, parietal, centrum ovale CVA. Less likely cryptogenic stroke.  Right intracranial artery stenosis noted on MRA. As per Neurovascular  Radiology ,  intracranial stenting is not warranted unless patient fails maximal medical management. Recommend a trial of medical management first. If patient has any new symptoms would then consider an intracranial angioplasty/possible stenting. No indication for DANA as per Cardiology. Recommend dual antiplatelet therapy aspirin 81 , plavix , statins high intensity  Incidental findings with bone lesion , daughter refused bone scan . Patient was unable to complete MRI cervical spine. Pt with hypercalcemia; Ca++ levels 10.9 on presentation now improving to 9.9. Upon chart review; Ca++ levels elevated at 10.9 in 2014, 11.1 in 11/2019. Hb normal; pt also with normal kidney. Nothing to suggest Myeloma at this time. The patient was seen by PT/OT and recommend NAREN.     Vital Signs Last 24 Hrs    T(C): 36.7 (27 Dec 2019 08:00), Max: 36.8 (26 Dec 2019 15:31)    T(F): 98 (27 Dec 2019 08:00), Max: 98.3 (26 Dec 2019 15:31)    HR: 78 (27 Dec 2019 08:00) (61 - 96)    BP: 132/82 (27 Dec 2019 08:00) (117/80 - 160/90)    BP(mean): --    RR: 18 (27 Dec 2019 08:00) (18 - 18)    SpO2: 96% (27 Dec 2019 08:00) (93% - 99%)                                CAPILLARY BLOOD GLUCOSE                        Hemoglobin A1C, Whole Blood: 5.6 % (12-23 @ 09:18) 74yoF hx prior CVA with residual left sided weakness, HTN presenting with left sided facial droop and slurred speech, admitted for CVA work up, left hip pain after fall CT scan done fracture ruled out . The patient  had MR of brain showing right temporal, parietal, centrum ovale CVA. Less likely cryptogenic stroke.  Right intracranial artery stenosis noted on MRA. As per Neurovascular  Radiology ,  intracranial stenting is not warranted unless patient fails maximal medical management. Recommend a trial of medical management first. If patient has any new symptoms would then consider an intracranial angioplasty/possible stenting. No indication for DANA as per Cardiology. Recommend dual antiplatelet therapy aspirin 81 , plavix , statins high intensity  Incidental findings with bone lesion , daughter refused bone scan . Patient was unable to complete MRI cervical spine. Pt with hypercalcemia; Ca++ levels 10.9 on presentation now improving to 9.9. Upon chart review; Ca++ levels elevated at 10.9 in 2014, 11.1 in 11/2019. Hb normal; pt also with normal kidney. Nothing to suggest Myeloma at this time. The patient was seen by PT/OT and recommend NAREN.     Vital Signs Last 24 Hrs    T(C): 36.7 (27 Dec 2019 08:00), Max: 36.8 (26 Dec 2019 15:31)    T(F): 98 (27 Dec 2019 08:00), Max: 98.3 (26 Dec 2019 15:31)    HR: 78 (27 Dec 2019 08:00) (61 - 96)    BP: 132/82 (27 Dec 2019 08:00) (117/80 - 160/90)    BP(mean): --    RR: 18 (27 Dec 2019 08:00) (18 - 18)    SpO2: 96% (27 Dec 2019 08:00) (93% - 99%)        [facility] 12/27:11                                CAPILLARY BLOOD GLUCOSE                        Hemoglobin A1C, Whole Blood: 5.6 % (12-23 @ 09:18) 74yoF hx prior CVA with residual left sided weakness, HTN presenting with left sided facial droop and slurred speech, admitted for CVA work up, left hip pain after fall CT scan done fracture ruled out . The patient  had MR of brain showing right temporal, parietal, centrum ovale CVA. Less likely cryptogenic stroke.  Right intracranial artery stenosis noted on MRA. As per Neurovascular  Radiology ,  intracranial stenting is not warranted unless patient fails maximal medical management. Recommend a trial of medical management first. If patient has any new symptoms would then consider an intracranial angioplasty/possible stenting. No indication for DANA as per Cardiology. Recommend dual antiplatelet therapy aspirin 81 , plavix , statins high intensity  Incidental findings with bone lesion , daughter refused bone scan . Patient was unable to complete MRI cervical spine. Pt with hypercalcemia; Ca++ levels 10.9 on presentation now improving to 9.9. Upon chart review; Ca++ levels elevated at 10.9 in 2014, 11.1 in 11/2019. Hb normal; pt also with normal kidney. Nothing to suggest Myeloma at this time. The patient was seen by PT/OT and recommend NAREN.     Vital Signs Last 24 Hrs    T(C): 36.7 (27 Dec 2019 08:00), Max: 36.8 (26 Dec 2019 15:31)    T(F): 98 (27 Dec 2019 08:00), Max: 98.3 (26 Dec 2019 15:31)    HR: 78 (27 Dec 2019 08:00) (61 - 96)    BP: 132/82 (27 Dec 2019 08:00) (117/80 - 160/90)    BP(mean): --    RR: 18 (27 Dec 2019 08:00) (18 - 18)    SpO2: 96% (27 Dec 2019 08:00) (93% - 99%)        [facility] 12/27:11        Hemoglobin A1C, Whole Blood: 5.6 % (12-23 @ 09:18)

## 2019-12-27 NOTE — PROGRESS NOTE ADULT - REASON FOR ADMISSION
Facial droop, dysarthria

## 2019-12-27 NOTE — DISCHARGE NOTE PROVIDER - PROVIDER TOKENS
PROVIDER:[[id number]]],PROVIDER:[TOKEN:[[id number]]],PROVIDER:[TOKEN:[[id number]]],PROVIDER:[TOKEN:[[id number]]]

## 2019-12-27 NOTE — DISCHARGE NOTE PROVIDER - CARE PROVIDER_API CALL
Foreign Cody)  Neurology  [address] [place] [address]  Phone: [phone]  Fax: [phone]  Follow Up Time:     Sweta Dumont)  Cardiology; Internal Medicine  [address] [facility], [address]  [place], [place] [id number]  Phone: [phone]  Fax: [phone]  Follow Up Time:     Mario Murphy)  Medicine  Nephrology  [address] [place] [address]  Phone: [phone]  Fax: [phone]  Follow Up Time:     Jesus Simon)  Neurology; Vascular Neurology  [address] [place] [address]  Phone: [phone]  Fax: [phone]  Follow Up Time:

## 2019-12-28 ENCOUNTER — INPATIENT (INPATIENT)
Facility: HOSPITAL | Age: 74
LOS: 2 days | Discharge: NURSING, REHAB OR LONG-TERM CARE | DRG: 64 | End: 2019-12-31
Attending: HOSPITALIST | Admitting: HOSPITALIST
Payer: MEDICARE

## 2019-12-28 VITALS
SYSTOLIC BLOOD PRESSURE: 140 MMHG | RESPIRATION RATE: 20 BRPM | DIASTOLIC BLOOD PRESSURE: 83 MMHG | HEART RATE: 88 BPM | OXYGEN SATURATION: 98 %

## 2019-12-28 DIAGNOSIS — I63.9 CEREBRAL INFARCTION, UNSPECIFIED: ICD-10-CM

## 2019-12-28 LAB
ALBUMIN SERPL ELPH-MCNC: 4.3 G/DL — SIGNIFICANT CHANGE UP (ref 3.3–5.2)
ALP SERPL-CCNC: 119 U/L — SIGNIFICANT CHANGE UP (ref 40–120)
ALT FLD-CCNC: 117 U/L — HIGH
ANION GAP SERPL CALC-SCNC: 13 MMOL/L — SIGNIFICANT CHANGE UP (ref 5–17)
APTT BLD: 24.2 SEC — LOW (ref 27.5–36.3)
AST SERPL-CCNC: 92 U/L — HIGH
BASOPHILS # BLD AUTO: 0.02 K/UL — SIGNIFICANT CHANGE UP (ref 0–0.2)
BASOPHILS NFR BLD AUTO: 0.4 % — SIGNIFICANT CHANGE UP (ref 0–2)
BILIRUB SERPL-MCNC: 0.4 MG/DL — SIGNIFICANT CHANGE UP (ref 0.4–2)
BLD GP AB SCN SERPL QL: SIGNIFICANT CHANGE UP
BUN SERPL-MCNC: 18 MG/DL — SIGNIFICANT CHANGE UP (ref 8–20)
CALCIUM SERPL-MCNC: 10.5 MG/DL — HIGH (ref 8.6–10.2)
CHLORIDE SERPL-SCNC: 100 MMOL/L — SIGNIFICANT CHANGE UP (ref 98–107)
CO2 SERPL-SCNC: 25 MMOL/L — SIGNIFICANT CHANGE UP (ref 22–29)
CREAT SERPL-MCNC: 0.89 MG/DL — SIGNIFICANT CHANGE UP (ref 0.5–1.3)
EOSINOPHIL # BLD AUTO: 0.07 K/UL — SIGNIFICANT CHANGE UP (ref 0–0.5)
EOSINOPHIL NFR BLD AUTO: 1.5 % — SIGNIFICANT CHANGE UP (ref 0–6)
GLUCOSE SERPL-MCNC: 118 MG/DL — HIGH (ref 70–115)
HCT VFR BLD CALC: 38.8 % — SIGNIFICANT CHANGE UP (ref 34.5–45)
HGB BLD-MCNC: 11.9 G/DL — SIGNIFICANT CHANGE UP (ref 11.5–15.5)
IMM GRANULOCYTES NFR BLD AUTO: 0.2 % — SIGNIFICANT CHANGE UP (ref 0–1.5)
INR BLD: 1.04 RATIO — SIGNIFICANT CHANGE UP (ref 0.88–1.16)
LYMPHOCYTES # BLD AUTO: 1.14 K/UL — SIGNIFICANT CHANGE UP (ref 1–3.3)
LYMPHOCYTES # BLD AUTO: 23.8 % — SIGNIFICANT CHANGE UP (ref 13–44)
MCHC RBC-ENTMCNC: 27.6 PG — SIGNIFICANT CHANGE UP (ref 27–34)
MCHC RBC-ENTMCNC: 30.7 GM/DL — LOW (ref 32–36)
MCV RBC AUTO: 90 FL — SIGNIFICANT CHANGE UP (ref 80–100)
MONOCYTES # BLD AUTO: 0.6 K/UL — SIGNIFICANT CHANGE UP (ref 0–0.9)
MONOCYTES NFR BLD AUTO: 12.5 % — SIGNIFICANT CHANGE UP (ref 2–14)
NEUTROPHILS # BLD AUTO: 2.95 K/UL — SIGNIFICANT CHANGE UP (ref 1.8–7.4)
NEUTROPHILS NFR BLD AUTO: 61.6 % — SIGNIFICANT CHANGE UP (ref 43–77)
PLATELET # BLD AUTO: 304 K/UL — SIGNIFICANT CHANGE UP (ref 150–400)
POTASSIUM SERPL-MCNC: 4.4 MMOL/L — SIGNIFICANT CHANGE UP (ref 3.5–5.3)
POTASSIUM SERPL-SCNC: 4.4 MMOL/L — SIGNIFICANT CHANGE UP (ref 3.5–5.3)
PROT SERPL-MCNC: 7.9 G/DL — SIGNIFICANT CHANGE UP (ref 6.6–8.7)
PROTHROM AB SERPL-ACNC: 12 SEC — SIGNIFICANT CHANGE UP (ref 10–12.9)
RBC # BLD: 4.31 M/UL — SIGNIFICANT CHANGE UP (ref 3.8–5.2)
RBC # FLD: 13.1 % — SIGNIFICANT CHANGE UP (ref 10.3–14.5)
SODIUM SERPL-SCNC: 138 MMOL/L — SIGNIFICANT CHANGE UP (ref 135–145)
TROPONIN T SERPL-MCNC: <0.01 NG/ML — SIGNIFICANT CHANGE UP (ref 0–0.06)
WBC # BLD: 4.79 K/UL — SIGNIFICANT CHANGE UP (ref 3.8–10.5)
WBC # FLD AUTO: 4.79 K/UL — SIGNIFICANT CHANGE UP (ref 3.8–10.5)

## 2019-12-28 PROCEDURE — 93010 ELECTROCARDIOGRAM REPORT: CPT

## 2019-12-28 PROCEDURE — 0042T: CPT

## 2019-12-28 PROCEDURE — 99291 CRITICAL CARE FIRST HOUR: CPT

## 2019-12-28 PROCEDURE — 70498 CT ANGIOGRAPHY NECK: CPT | Mod: 26

## 2019-12-28 PROCEDURE — 70496 CT ANGIOGRAPHY HEAD: CPT | Mod: 26

## 2019-12-28 RX ORDER — FENTANYL CITRATE 50 UG/ML
25 INJECTION INTRAVENOUS ONCE
Refills: 0 | Status: DISCONTINUED | OUTPATIENT
Start: 2019-12-28 | End: 2019-12-28

## 2019-12-28 RX ORDER — NOREPINEPHRINE BITARTRATE/D5W 8 MG/250ML
0.05 PLASTIC BAG, INJECTION (ML) INTRAVENOUS
Qty: 8 | Refills: 0 | Status: DISCONTINUED | OUTPATIENT
Start: 2019-12-28 | End: 2019-12-28

## 2019-12-28 RX ADMIN — FENTANYL CITRATE 25 MICROGRAM(S): 50 INJECTION INTRAVENOUS at 20:20

## 2019-12-28 NOTE — CHART NOTE - NSCHARTNOTEFT_GEN_A_CORE
[facility]                                        Neurology at [place]                                 Tim Phipps, & J Luis                                  [address]                                        [place], [place], [address]                                             [phone]          Code stroke was called by the ER for this patient.   I discussed the case with Dr Bronw.  She was recently admitted for stroke this past week and was seen by [facility].   It appears that she was discharged yesterday.  Apparently today she was noted to have increased slurred speech and facial drooping and was brought back to the ER.  MRI brain on 12/22 showed acute/subacute infarct right posterior temporal.  This was adjacent to chronic infarct in the same area.     She would not be a thrombolytic candidate due to recent stroke, but could potentially be a candidate for intervention.     ER will consult [facility] to consult.     I discussed the case with Dr Simon who had seen the patient on last admission.     He will review imaging and decide if intervention warranted.

## 2019-12-28 NOTE — STROKE CODE NOTE - NIH STROKE SCALE: 4. FACIAL PALSY, QM
(2) Partial paralysis (total or near-total paralysis of lower face) (3) Complete paralysis of one or both sides (absence of facial movement in the upper and lower face)

## 2019-12-28 NOTE — ED PROVIDER NOTE - CLINICAL SUMMARY MEDICAL DECISION MAKING FREE TEXT BOX
73yo female with recent R DIONI/MCA watershed infarct, on heparin, p/w slurred speech/confusion, L facial droop (new since discharge yesterday), patient is not a candidate for tPA given recent infarct- will obtain CT head/CTA head/neck/CT perfusion, labs, EKG, admission. Laney Brown DO 75yo female with recent R DIONI/MCA watershed infarct, on heparin, p/w slurred speech/confusion, L facial droop (new since discharge yesterday)- concerning for new infarct vs exacerbation of old CVA- patient is not a candidate for tPA given recent infarct- will obtain CT head/CTA head/neck/CT perfusion, labs, EKG, admission. Laney Brown DO

## 2019-12-28 NOTE — ED PROVIDER NOTE - PROGRESS NOTE DETAILS
D/w Dr. Menjivar- will see patient in AM. Laney Brown DO Case discussed with Dr. Simon, recommending that patient be transferred to [facility] for NSICU, pressors (goal SBP>180) to maintain CPP. Will discuss with [facility] Micaela. Laney Brown DO Discussed case with patient and patient's daughter- at this time patient and daughter do not want to pursue surgery or transfer to [facility]- prefer comfort measures. D/w patient using Creole  #[id number]. Patient AOx4 and understands risks/benefits/alternatives of procedure. Will admit to hospital for further management. At this time patient remains full code. Laney Brown DO     Creole  #[id number]

## 2019-12-28 NOTE — ED PROVIDER NOTE - OBJECTIVE STATEMENT
Code stroke was activated at 1934. Pt only speaks creole. Last known well at 1730 today.   74 year old F pt with past medical history significant for CVA (this week, pt on Heparin), h/o L hemiparesis from old stroke, HTN presents to the ED from [facility] c/o slurred speech, left sided weakness, and a fall (+ head trauma) which occurred at 1830 today. Associated left sided facial droop. Pt was discharged from [facility] yesterday after a recent stroke. Per daughter, pt presented with similar slurred speech, left sided weakness prior to being admitted to [facility] for her last stroke. Upon being discharged, pt did not have slurred speech and her weakness was significantly less than it is at this time.   Pt's daughter went to see pt at 1700 today, and was informed that pt had not eaten all day (pt was given food but was not assisted in eating, pt is known to not be able to eat independently). Daughter noticed slurred speech and facial droop soon after arriving, at 1730, but dismissed symptoms as having been related to pt not eating all day. Pt was witnessed rolling out of her bed onto the floor at 1830, hitting her head against the floor. NKDA. Denies vomiting, LOC.

## 2019-12-28 NOTE — ED PROVIDER NOTE - PHYSICAL EXAMINATION
Gen: NAD, AOx3  Head: NCAT  HEENT: PERRL, oral mucosa moist, normal conjunctiva, oropharynx clear without exudate or erythema  Lung: CTAB, no respiratory distress, no wheezing, rales, rhonchi  CV: normal s1/s2, rrr, no murmurs, Normal perfusion, pulses 2+ throughout  Abd: soft, NTND, no CVA tenderness  MSK: No edema, no visible deformities, full range of motion in all 4 extremities  Neuro: CN II-XII grossly intact, No focal neurologic deficits  Skin: No rash   Psych: normal affect Gen: NAD, AOx3  Head: NCAT  HEENT: rightward gaze  Lung: CTAB, no respiratory distress, no wheezing, rales, rhonchi  CV: normal s1/s2, rrr, no murmurs, Normal perfusion  Abd: soft, NTND  MSK: No edema, no visible deformities, full range of motion in all 4 extremities  Neuro: L facial droop, unable to move L arm or L leg, RUE and RLE 5/5 strength  Skin: No rash   Psych: normal affect

## 2019-12-28 NOTE — ED CLERICAL - NS ED CLERK NOTE PRE-ARRIVAL INFORMATION; ADDITIONAL PRE-ARRIVAL INFORMATION
This patient is enrolled in the [facility] readmission reduction program and has active care navigation. This patient can be followed up by the care navigation team within 24 hours. To arrange close follow-up or to obtain additional clinical information about this patient, please call the contact number above. Please speak with the [facility] ED Case Manager for assistance with discharge planning

## 2019-12-28 NOTE — STROKE CODE NOTE - NIH STROKE SCALE: 8. SENSORY, QM
(1) Mild-to-moderate sensory loss; patient feels pinprick is less sharp or is dull on the affected side; or there is a loss of superficial pain with pinprick, but patient is aware of being touched
22:23

## 2019-12-28 NOTE — CONSULT NOTE ADULT - SUBJECTIVE AND OBJECTIVE BOX
Patient is a 74y old  Female who presents with a chief complaint of worsening aphasia, right gaze preference, right facial droop and left hemiplegia. Patient discharged yesterday with recent CVA and ICA stenosis. On plavix and asa. In rehab today daughter noticed patient worse and not at her baseline.       PAST MEDICAL & SURGICAL HISTORY:  Paralysis of left upper extremity  CVA (cerebral vascular accident)  No significant past surgical history      FAMILY HISTORY:  No pertinent family history in first degree relatives        Social Hx:  Nonsmoker, no drug or alcohol use    MEDICATIONS  (STANDING):   Plavix 75mg daily , Asa 81mg daily, sq heparin      Allergies: No Known Allergies    ROS: Pertinent positives in HPI, all other ROS were reviewed and are negative.      Vital Signs Last 24 Hrs  T(C): 36.9 (28 Dec 2019 22:17), Max: 36.9 (28 Dec 2019 22:17)  T(F): 98.5 (28 Dec 2019 22:17), Max: 98.5 (28 Dec 2019 22:17)  HR: 78 (28 Dec 2019 22:17) (78 - 88)  BP: 187/96 (28 Dec 2019 22:17) (140/83 - 187/96)  BP(mean): --  RR: 20 (28 Dec 2019 22:17) (18 - 20)  SpO2: 99% (28 Dec 2019 22:17) (94% - 100%)        Constitutional: awake and alert.  HEENT: PERRLA, EOMI,   Neck: Supple.  Respiratory: Breath sounds are clear bilaterally  Cardiovascular: S1 and S2, regular   Gastrointestinal: soft, nontender  Extremities:  no edema  Skin: No rashes    Neurological exam:  Awake, alert, aphasic, severely dysarthric, Right gaze preference does not cross midline, follows one step commands inconsistently. Left 0/5 LLE 0/5 RUE 5/5 RLE 5/5. sensation intact to light touch on right. neglect of left side including diminished sensation.   NIHSS: 20      Labs:                        11.9   4.79  )-----------( 304      ( 28 Dec 2019 20:19 )             38.8     12-28    138  |  100  |  18.0  ----------------------------<  118<H>  4.4   |  25.0  |  0.89    Ca    10.5<H>      28 Dec 2019 20:19    TPro  7.9  /  Alb  4.3  /  TBili  0.4  /  DBili  x   /  AST  92<H>  /  ALT  117<H>  /  AlkPhos  119  12-28 12-23 [id number] 5.6    12-22 Chol 207<H>  HDL 50 Trig 100  PT/INR - ( 28 Dec 2019 20:19 )   PT: 12.0 sec;   INR: 1.04 ratio         PTT - ( 28 Dec 2019 20:19 )  PTT:24.2 sec    RADIOLOGY:    < from: CT Angio Neck w/ IV Cont (12.28.19 @ 20:08) >  CTA neck:  The visualized aorta and great vessels demonstrate no significant stenosis.   The common carotid arteries demonstrate no significant stenosis.  The internal carotid arteries and external carotid arteries demonstrate no significant stenosis.  The vertebral arteries demonstrate no significant stenosis.    All measurements are performed using standard NASCET criteria.      CTA brain:  Severe stenosis of the right supraclinoid internal carotid artery. Distal left internal carotid artery is patent. However, there is mild irregular fusiform dilatation of the cavernous segment of the left internal carotid artery.    The [place] is normal in appearance.  Multifocal mild stenoses within the distal right M1 and M2 and bilateral proximal PCA branches. The visualized cerebral arteries are otherwise unremarkable.  The vertebrobasilar junction and basilar artery are normal. Mild stenosis of the mid basilar artery.    IMPRESSION:  CT brain perfusion: Areas of perfusion deficits within the right frontal lobe known to correspond to recent acute/subacute infarct.    CTA neck: No stenosis. No dissection.    CTA brain: Multifocal stenoses as detailed above. Of note, there is severe stenosis of the right supraclinoid internal carotid artery.     COMMENT:   Reference per NASCET criteria for degree of stenosis: Mild: less than 50% stenosis. Moderate: 50-69% stenosis. Severe: 70-94% stenosis. Near occlusion: 95-99% stenosis.    < end of copied text >  < from: CT Brain Stroke Protocol (12.28.19 @ 19:57) >  IMPRESSION:     Expected changes from acute/subacute right frontoparietal infarction with local mass effect and compression of the right lateral ventricle. No midline shift or CT evidence of hemorrhagic conversion.    Chronic posterior right temporal and occipital infarction.  Chronic microvascular ischemic changes.    Findings were discussed with Dr. Brown at 7:50 PM on 12/20/2019 who indicated that the communication was understood.      < end of copied text >

## 2019-12-28 NOTE — STROKE CODE NOTE - NIH STROKE SCALE: 2. BEST GAZE, QM
(1) Partial gaze palsy; gaze is abnormal in one or both eyes, but forced deviation or total gaze paresis is not present (2) Forced deviation, or total gaze paresis not overcome by the oculocephalic maneuver

## 2019-12-29 DIAGNOSIS — I63.9 CEREBRAL INFARCTION, UNSPECIFIED: ICD-10-CM

## 2019-12-29 DIAGNOSIS — Z29.9 ENCOUNTER FOR PROPHYLACTIC MEASURES, UNSPECIFIED: ICD-10-CM

## 2019-12-29 DIAGNOSIS — I63.231 CEREBRAL INFARCTION DUE TO UNSPECIFIED OCCLUSION OR STENOSIS OF RIGHT CAROTID ARTERIES: ICD-10-CM

## 2019-12-29 DIAGNOSIS — M54.5 LOW BACK PAIN: ICD-10-CM

## 2019-12-29 DIAGNOSIS — I10 ESSENTIAL (PRIMARY) HYPERTENSION: ICD-10-CM

## 2019-12-29 PROCEDURE — 12345: CPT | Mod: NC

## 2019-12-29 PROCEDURE — 99223 1ST HOSP IP/OBS HIGH 75: CPT

## 2019-12-29 PROCEDURE — 99233 SBSQ HOSP IP/OBS HIGH 50: CPT

## 2019-12-29 RX ORDER — ACETAMINOPHEN 500 MG
650 TABLET ORAL EVERY 6 HOURS
Refills: 0 | Status: DISCONTINUED | OUTPATIENT
Start: 2019-12-29 | End: 2019-12-31

## 2019-12-29 RX ORDER — HEPARIN SODIUM 5000 [USP'U]/ML
5000 INJECTION INTRAVENOUS; SUBCUTANEOUS EVERY 12 HOURS
Refills: 0 | Status: DISCONTINUED | OUTPATIENT
Start: 2019-12-29 | End: 2019-12-29

## 2019-12-29 RX ORDER — ASPIRIN/CALCIUM CARB/MAGNESIUM 324 MG
81 TABLET ORAL DAILY
Refills: 0 | Status: DISCONTINUED | OUTPATIENT
Start: 2019-12-29 | End: 2019-12-31

## 2019-12-29 RX ORDER — INFLUENZA VIRUS VACCINE 15; 15; 15; 15 UG/.5ML; UG/.5ML; UG/.5ML; UG/.5ML
0.5 SUSPENSION INTRAMUSCULAR ONCE
Refills: 0 | Status: DISCONTINUED | OUTPATIENT
Start: 2019-12-29 | End: 2019-12-31

## 2019-12-29 RX ORDER — CLOPIDOGREL BISULFATE 75 MG/1
75 TABLET, FILM COATED ORAL DAILY
Refills: 0 | Status: DISCONTINUED | OUTPATIENT
Start: 2019-12-29 | End: 2019-12-31

## 2019-12-29 RX ORDER — ATORVASTATIN CALCIUM 80 MG/1
80 TABLET, FILM COATED ORAL AT BEDTIME
Refills: 0 | Status: DISCONTINUED | OUTPATIENT
Start: 2019-12-29 | End: 2019-12-31

## 2019-12-29 RX ORDER — LIDOCAINE 4 G/100G
1 CREAM TOPICAL DAILY
Refills: 0 | Status: DISCONTINUED | OUTPATIENT
Start: 2019-12-29 | End: 2019-12-31

## 2019-12-29 RX ORDER — ERGOCALCIFEROL 1.25 MG/1
2000 CAPSULE ORAL DAILY
Refills: 0 | Status: DISCONTINUED | OUTPATIENT
Start: 2019-12-29 | End: 2019-12-31

## 2019-12-29 RX ORDER — TRAMADOL HYDROCHLORIDE 50 MG/1
25 TABLET ORAL EVERY 6 HOURS
Refills: 0 | Status: DISCONTINUED | OUTPATIENT
Start: 2019-12-29 | End: 2019-12-31

## 2019-12-29 RX ADMIN — ATORVASTATIN CALCIUM 80 MILLIGRAM(S): 80 TABLET, FILM COATED ORAL at 22:18

## 2019-12-29 RX ADMIN — LIDOCAINE 1 PATCH: 4 CREAM TOPICAL at 17:39

## 2019-12-29 NOTE — SWALLOW BEDSIDE ASSESSMENT ADULT - SLP PERTINENT HISTORY OF CURRENT PROBLEM
pt recently admitted for right sided CVA, discharged to [facility] on 12/27, presented to ED for worsening left sided facial droop, aphasia, right gaze preference, and left hemiplegia

## 2019-12-29 NOTE — H&P ADULT - PROBLEM SELECTOR PROBLEM 4
Need for prophylactic measure Chronic low back pain, unspecified back pain laterality, unspecified whether sciatica present

## 2019-12-29 NOTE — SWALLOW BEDSIDE ASSESSMENT ADULT - ORAL PHASE
Within functional limits Delayed oral transit time/Stasis in lateral sulci/Decreased anterior-posterior movement of the bolus

## 2019-12-29 NOTE — H&P ADULT - HISTORY OF PRESENT ILLNESS
74yoF  pmhx of HTN, prior CVA with residual left sided weakness, recently admitted for right sided CVA and high graded right ICA stenosis discharged to [facility] on 12/27 presented to ED for worsening left sided facial droop, aphasia, right gaze preference, and left hemiplegia.  Pt [place] Creople speaking, knows limited English, Daughter Faye and grandson at bedside. Daughter provided hx, pt was noted to have worsening left facial droop and slurred speech yesterday noted by daughter at rehab center. In the ED pt was code stroke, evaluated by neurology, not tpa candidate, CT head showed  expected changes from acute/subacute right frontoparietal infarction with local mass effect and compression of the right lateral ventricle. No midline shift or CT evidence of hemorrhagic conversion. Dr. Leone with [place]  offered neurovascular intervention but daughter Faye and patient decline any invasive procedures at this time, wish to keep patient full code. Denies cp, sob, palpitations, fever, chills.

## 2019-12-29 NOTE — CONSULT NOTE ADULT - SUBJECTIVE AND OBJECTIVE BOX
HPI: Pt is poor historian and information obtained from the chart.  75yo Female unknown handed pmhx of HTN, prior CVA with residual left sided weakness, recently admitted for right sided CVA and high graded right ICA stenosis seen by Dr Cody discharged to [facility] on 12/27 presented to ED yesterday for worsening left sided facial droop, aphasia, right gaze preference, and left hemiplegia.  As per ER documentation Pt Ukrainian Creople speaking, knows limited English, Daughter Faye and grandson at bedside. Daughter had provided hx, pt was noted to have worsening left facial droop and slurred speech yesterday noted by daughter at rehab center. In the ED pt was code stroke, evaluated by neurology, not tpa candidate as per Dr Melton, CT head showed  expected changes from acute/subacute right frontoparietal infarction with local mass effect and compression of the right lateral ventricle. No midline shift or CT evidence of hemorrhagic conversion. Dr. Leone with [place]  offered neurovascular intervention but daughter Faye and patient decline any invasive procedures at this time, wish to keep patient full code. Denies cp, sob, palpitations, fever, chills. Now called for neurological evaluation.  No seizures reported.      PAST MEDICAL & SURGICAL HISTORY:  Paralysis of left upper extremity  CVA (cerebral vascular accident)  No significant past surgical history    MEDICATIONS  (STANDING):  acetaminophen   Tablet .. 650 milliGRAM(s) Oral every 6 hours  aspirin  chewable 81 milliGRAM(s) Oral daily  atorvastatin 80 milliGRAM(s) Oral at bedtime  clopidogrel Tablet 75 milliGRAM(s) Oral daily  ergocalciferol Drops 2000 Unit(s) Oral daily  influenza   Vaccine 0.5 milliLiter(s) IntraMuscular once  lidocaine   Patch 1 Patch Transdermal daily    MEDICATIONS  (PRN):  traMADol 25 milliGRAM(s) Oral every 6 hours PRN Moderate Pain (4 - 6)    Allergies    No Known Allergies    Intolerances        FAMILY HISTORY:  No pertinent family history in first degree relatives          SOCIAL HISTORY:  Denies toxic habits;     REVIEW OF SYSTEMS:    As noted in the HPI.    VITAL SIGNS:  Vital Signs Last 24 Hrs  T(C): 37 (29 Dec 2019 10:33), Max: 37 (29 Dec 2019 10:33)  T(F): 98.6 (29 Dec 2019 10:33), Max: 98.6 (29 Dec 2019 10:33)  HR: 100 (29 Dec 2019 10:33) (73 - 100)  BP: 184/107 (29 Dec 2019 04:50) (140/83 - 187/96)  BP(mean): --  RR: 16 (29 Dec 2019 04:50) (16 - 20)  SpO2: 99% (29 Dec 2019 10:33) (94% - 100%)    PHYSICAL EXAMINATION:  General: Well-developed, well nourished, in no acute distress.  Eyes: Conjunctiva and sclera clear. Fundoscopic examination was deferred.  Neck: Supple.  Cardiac: +S1 & S2; Regular.  Chest: CTA b/l.    Musculoskeletal: No tenderness on palpation of spine.  No Brudzinski/Kernig's sign.    Neurologic:  - Mental Status:  Alert, awake, oriented to person.  Dysarthria and follows somewhat.  Cranial Nerves II-XII:  Pupils 3 mm b/l; reactive; + corneal; L facial asymmetry.    - Motor:  L dense hemiplegia.  - Reflexes:  2+ and symmetric throughout.  Plantars wd b/l.  - Sensory:  Intact to noxious.  - Coordination:  Pt unable.   - Gait: Deferred.      LABS:                          11.9   4.79  )-----------( 304      ( 28 Dec 2019 20:19 )             38.8     28 Dec 2019 20:19    138    |  100    |  18.0   ----------------------------<  118    4.4     |  25.0   |  0.89     Ca    10.5       28 Dec 2019 20:19    TPro  7.9    /  Alb  4.3    /  TBili  0.4    /  DBili  x      /  AST  92     /  ALT  117    /  AlkPhos  119    28 Dec 2019 20:19    LIVER FUNCTIONS - ( 28 Dec 2019 20:19 )  Alb: 4.3 g/dL / Pro: 7.9 g/dL / ALK PHOS: 119 U/L / ALT: 117 U/L / AST: 92 U/L / GGT: x           PT/INR - ( 28 Dec 2019 20:19 )   PT: 12.0 sec;   INR: 1.04 ratio         PTT - ( 28 Dec 2019 20:19 )  PTT:24.2 sec      RADIOLOGY & ADDITIONAL STUDIES:      CT 12/28/19:  CT brain perfusion: Areas of perfusion deficits within the right frontal lobe known to correspond to recent acute/subacute infarct.  	  	CTA neck: No stenosis. No dissection.  	  	CTA brain: Multifocal stenoses as detailed above. Of note, there is severe stenosis of the right supraclinoid internal carotid artery.    MR Head No Cont (12.22.19 @ 10:30) >  Area of encephalomalacia and gliosis is seen involving the right posterior temporal/occipital region. This compatible with an old infarct. There are scattered areasof T2 pronation with restricted diffusion seen involving the right posterior temporal, superior frontal parietal and right centrum semiovale region. These findings are compatible with areas of acute to subacute infarcts. No hemorrhagic transformationis seen. No significant shift or herniation is seen.    MR Cervical Spine No Cont (12.27.19 @ 12:43) >  Patient could not complete the exam. The obtained images are degraded by motion.  Abnormal low T1 signal in the posterior C3 vertebral body and C4-T1 vertebral bodies suggesting marrow reactivation or replacement.     TTE Echo Limited or F/U (12.23.19 @ 11:19) >   1. Limited echo for RV function.   2. Normal global left ventricular systolic function.

## 2019-12-29 NOTE — PHYSICAL THERAPY INITIAL EVALUATION ADULT - PASSIVE RANGE OF MOTION EXAMINATION, REHAB EVAL
left LE WNL and left UE limited by flexor tone/Right UE Passive ROM was WNL (within normal limits)/Right LE Passive ROM was WNL (within normal limits)

## 2019-12-29 NOTE — SWALLOW BEDSIDE ASSESSMENT ADULT - SWALLOW EVAL: RECOMMENDED FEEDING/EATING TECHNIQUES
maintain upright posture during/after eating for 30 mins/allow for swallow between intakes/check mouth frequently for oral residue/pocketing/crush medication (when feasible)/position upright (90 degrees)/small sips/bites

## 2019-12-29 NOTE — H&P ADULT - PROBLEM SELECTOR PLAN 2
right supraclinoid high grade ICA stenosis  refused neurovascular intervention   rest of plan as above

## 2019-12-29 NOTE — CHART NOTE - NSCHARTNOTEFT_GEN_A_CORE
Patient seen and examined at bedside. Creole interpretation by RN. Await MRI. Neuro appreciated. Diet added. Disccussed with pt's family  in details       PHYSICAL EXAM-  GENERAL: NAD, well-groomed, well-developed  HEAD:  Atraumatic, Normocephalic  EYES: EOMI, PERRLA, conjunctiva and sclera clear  NECK: Supple, No JVD  NERVOUS SYSTEM:  awake, alert, aphasic, dysarthria, left facial droop, LUE & LLE 0/5, RLE/RUE 5/5, sensation intact   CHEST/LUNG: Clear to percussion bilaterally; No rales, rhonchi, wheezing, or rubs  HEART: Regular rate and rhythm; No murmurs, rubs, or gallops  ABDOMEN: Soft, Nontender, Nondistended; Bowel sounds present  EXTREMITIES:  2+ Peripheral Pulses, No clubbing, cyanosis, or edema  SKIN: No rashes or lesions

## 2019-12-29 NOTE — PHYSICAL THERAPY INITIAL EVALUATION ADULT - ADDITIONAL COMMENTS
Pt. poor historian. Daughter at bedside reporting pt. was at [facility] and required 1-2 person assist for bed mobility and transfers and was beginning to ambulate, but unsure of level of assist or device.

## 2019-12-29 NOTE — H&P ADULT - PROBLEM SELECTOR PROBLEM 2
I will SWITCH the dose or number of times a day I take the medications listed below when I get home from the hospital:  None
Stenosis of right internal carotid artery with cerebral infarction

## 2019-12-29 NOTE — ED ADULT NURSE REASSESSMENT NOTE - NS ED NURSE REASSESS COMMENT FT1
Patient gregory speaking communicated via  [id number]. Patient and family refusing transfer to [facility] and any invasive surgical intervention for stroke upon offering by MD tolentino and Felicita at bed. Patient alert and oriented times four.

## 2019-12-29 NOTE — PHYSICAL THERAPY INITIAL EVALUATION ADULT - ACTIVE RANGE OF MOTION EXAMINATION, REHAB EVAL
Right LE Active ROM was WFL (within functional limits)/left hemiparesis/Right UE Active ROM was WNL (within normal limits)

## 2019-12-29 NOTE — PHYSICAL THERAPY INITIAL EVALUATION ADULT - PERTINENT HX OF CURRENT PROBLEM, REHAB EVAL
75yo Female unknown handed pmhx of HTN, prior CVA with residual left sided weakness, recently admitted for right sided CVA and high graded right ICA stenosis seen by Dr Cody discharged to [facility] on 12/27 presented to ED yesterday for worsening left sided facial droop, aphasia, right gaze preference, and left hemiplegia.

## 2019-12-29 NOTE — SWALLOW BEDSIDE ASSESSMENT ADULT - SWALLOW EVAL: DIAGNOSIS
Pt presents with moderate-severe oral dysphagia characterized by reduced left buccal tone, reduced labial seal, and reduced lingual strength and coordination. Pharyngeal stage appears grossly WFL for small bites and sips of puree and thin liquids.  No signs of penetration/aspiration were observed.

## 2019-12-29 NOTE — H&P ADULT - PROBLEM SELECTOR PLAN 1
acute on subacute right frontoparietal infarct and severe right supraclinoid ICA stenosis  not tpa candidate  Neuro consult Dr. Leone noted, pt and daughter refused neurovascular intervention, risk and benefits was explained to daughter Faye and patient with primary patient's language [place] creole      pt high risk for hemorrhagic conversion while on asa and plavix, need close monitoring  NPO, failed dysphagia screen   neuro checks Q4   speech and swallow eval   pt/ot and social work eval   palliative care eval, regarding goal of care, pt full code at this time   further recs per Neurology acute on subacute right frontoparietal infarct and severe right supraclinoid ICA stenosis  not tpa candidate  Neuro consult Dr. Leone noted, pt and daughter refused neurovascular intervention, risk and benefits was explained to daughter Faye and patient with primary patient's language [place] creole      pt high risk for hemorrhagic conversion while on asa and plavix, need close monitoring  cont with asa, high intensity atorvastatin   NPO, failed dysphagia screen   neuro checks Q4   speech and swallow eval   pt/ot and social work eval   palliative care eval, regarding goal of care, pt full code at this time   further recs per Neurology acute on subacute right frontoparietal infarct and severe right supraclinoid ICA stenosis  not tpa candidate  Neuro consult Dr. Leone noted, pt and daughter refused neurovascular intervention, risk and benefits was explained to daughter Faye and patient with primary patient's language Stateless creole      pt high risk for hemorrhagic conversion while on asa and plavix, need close monitoring  cont with asa, high intensity atorvastatin   NPO, failed dysphagia screen   neuro checks Q4   speech and swallow eval   pt/ot and social work eval   palliative care eval, regarding goal of care, pt full code at this time   further recs per Neurology  neuro Dr. swann group consulted

## 2019-12-29 NOTE — H&P ADULT - ASSESSMENT
74yoF  pmhx of HTN, prior CVA with residual left sided weakness, recently admitted for right sided CVA and high graded right ICA stenosis discharged to [facility] on 12/27 presented to ED for worsening left sided facial droop, aphasia, right gaze preference, and left hemiplegia.

## 2019-12-30 ENCOUNTER — INBOUND DOCUMENT (OUTPATIENT)
Age: 74
End: 2019-12-30

## 2019-12-30 LAB
ANION GAP SERPL CALC-SCNC: 14 MMOL/L — SIGNIFICANT CHANGE UP (ref 5–17)
BASOPHILS # BLD AUTO: 0.01 K/UL — SIGNIFICANT CHANGE UP (ref 0–0.2)
BASOPHILS NFR BLD AUTO: 0.2 % — SIGNIFICANT CHANGE UP (ref 0–2)
BUN SERPL-MCNC: 19 MG/DL — SIGNIFICANT CHANGE UP (ref 8–20)
CALCIUM SERPL-MCNC: 11.1 MG/DL — HIGH (ref 8.6–10.2)
CHLORIDE SERPL-SCNC: 95 MMOL/L — LOW (ref 98–107)
CO2 SERPL-SCNC: 24 MMOL/L — SIGNIFICANT CHANGE UP (ref 22–29)
CREAT SERPL-MCNC: 0.84 MG/DL — SIGNIFICANT CHANGE UP (ref 0.5–1.3)
EOSINOPHIL # BLD AUTO: 0.01 K/UL — SIGNIFICANT CHANGE UP (ref 0–0.5)
EOSINOPHIL NFR BLD AUTO: 0.2 % — SIGNIFICANT CHANGE UP (ref 0–6)
GLUCOSE SERPL-MCNC: 120 MG/DL — HIGH (ref 70–115)
HCT VFR BLD CALC: 42.1 % — SIGNIFICANT CHANGE UP (ref 34.5–45)
HGB BLD-MCNC: 13.5 G/DL — SIGNIFICANT CHANGE UP (ref 11.5–15.5)
IMM GRANULOCYTES NFR BLD AUTO: 0.3 % — SIGNIFICANT CHANGE UP (ref 0–1.5)
LYMPHOCYTES # BLD AUTO: 1.09 K/UL — SIGNIFICANT CHANGE UP (ref 1–3.3)
LYMPHOCYTES # BLD AUTO: 18.9 % — SIGNIFICANT CHANGE UP (ref 13–44)
MAGNESIUM SERPL-MCNC: 2.4 MG/DL — SIGNIFICANT CHANGE UP (ref 1.6–2.6)
MCHC RBC-ENTMCNC: 27.8 PG — SIGNIFICANT CHANGE UP (ref 27–34)
MCHC RBC-ENTMCNC: 32.1 GM/DL — SIGNIFICANT CHANGE UP (ref 32–36)
MCV RBC AUTO: 86.6 FL — SIGNIFICANT CHANGE UP (ref 80–100)
MONOCYTES # BLD AUTO: 0.46 K/UL — SIGNIFICANT CHANGE UP (ref 0–0.9)
MONOCYTES NFR BLD AUTO: 8 % — SIGNIFICANT CHANGE UP (ref 2–14)
NEUTROPHILS # BLD AUTO: 4.17 K/UL — SIGNIFICANT CHANGE UP (ref 1.8–7.4)
NEUTROPHILS NFR BLD AUTO: 72.4 % — SIGNIFICANT CHANGE UP (ref 43–77)
PHOSPHATE SERPL-MCNC: 3.6 MG/DL — SIGNIFICANT CHANGE UP (ref 2.4–4.7)
PLATELET # BLD AUTO: 362 K/UL — SIGNIFICANT CHANGE UP (ref 150–400)
POTASSIUM SERPL-MCNC: 4 MMOL/L — SIGNIFICANT CHANGE UP (ref 3.5–5.3)
POTASSIUM SERPL-SCNC: 4 MMOL/L — SIGNIFICANT CHANGE UP (ref 3.5–5.3)
RBC # BLD: 4.86 M/UL — SIGNIFICANT CHANGE UP (ref 3.8–5.2)
RBC # FLD: 13 % — SIGNIFICANT CHANGE UP (ref 10.3–14.5)
SODIUM SERPL-SCNC: 133 MMOL/L — LOW (ref 135–145)
WBC # BLD: 5.76 K/UL — SIGNIFICANT CHANGE UP (ref 3.8–10.5)
WBC # FLD AUTO: 5.76 K/UL — SIGNIFICANT CHANGE UP (ref 3.8–10.5)

## 2019-12-30 PROCEDURE — 99232 SBSQ HOSP IP/OBS MODERATE 35: CPT

## 2019-12-30 PROCEDURE — 70551 MRI BRAIN STEM W/O DYE: CPT | Mod: 26

## 2019-12-30 RX ORDER — LOSARTAN POTASSIUM 100 MG/1
100 TABLET, FILM COATED ORAL DAILY
Refills: 0 | Status: DISCONTINUED | OUTPATIENT
Start: 2019-12-30 | End: 2019-12-31

## 2019-12-30 RX ORDER — AMLODIPINE BESYLATE 2.5 MG/1
10 TABLET ORAL DAILY
Refills: 0 | Status: DISCONTINUED | OUTPATIENT
Start: 2019-12-30 | End: 2019-12-31

## 2019-12-30 RX ORDER — LABETALOL HCL 100 MG
100 TABLET ORAL THREE TIMES A DAY
Refills: 0 | Status: DISCONTINUED | OUTPATIENT
Start: 2019-12-30 | End: 2019-12-31

## 2019-12-30 RX ORDER — HYDRALAZINE HCL 50 MG
10 TABLET ORAL EVERY 6 HOURS
Refills: 0 | Status: DISCONTINUED | OUTPATIENT
Start: 2019-12-30 | End: 2019-12-31

## 2019-12-30 RX ADMIN — Medication 81 MILLIGRAM(S): at 12:23

## 2019-12-30 RX ADMIN — Medication 650 MILLIGRAM(S): at 12:34

## 2019-12-30 RX ADMIN — Medication 650 MILLIGRAM(S): at 12:23

## 2019-12-30 RX ADMIN — Medication 650 MILLIGRAM(S): at 22:05

## 2019-12-30 RX ADMIN — CLOPIDOGREL BISULFATE 75 MILLIGRAM(S): 75 TABLET, FILM COATED ORAL at 12:23

## 2019-12-30 RX ADMIN — Medication 650 MILLIGRAM(S): at 02:38

## 2019-12-30 RX ADMIN — LOSARTAN POTASSIUM 100 MILLIGRAM(S): 100 TABLET, FILM COATED ORAL at 17:11

## 2019-12-30 RX ADMIN — Medication 650 MILLIGRAM(S): at 17:16

## 2019-12-30 RX ADMIN — LIDOCAINE 1 PATCH: 4 CREAM TOPICAL at 12:24

## 2019-12-30 RX ADMIN — Medication 100 MILLIGRAM(S): at 23:34

## 2019-12-30 RX ADMIN — Medication 100 MILLIGRAM(S): at 17:11

## 2019-12-30 RX ADMIN — ATORVASTATIN CALCIUM 80 MILLIGRAM(S): 80 TABLET, FILM COATED ORAL at 23:25

## 2019-12-30 RX ADMIN — TRAMADOL HYDROCHLORIDE 25 MILLIGRAM(S): 50 TABLET ORAL at 17:11

## 2019-12-30 NOTE — PROGRESS NOTE ADULT - PROBLEM SELECTOR PLAN 1
acute on subacute right frontoparietal infarct and severe right supraclinoid ICA stenosis  not tpa candidate  Neuro consult Dr. Leone noted, pt and daughter refused neurovascular intervention, risk and benefits was explained to daughter Faye and patient with primary patient's language [place]an creole      pt high risk for hemorrhagic conversion while on asa and plavix, need close monitoring  cont with asa, high intensity atorvastatin, plavix  Pureed with thins per S & S eval  neuro checks Q4   pt/ot and social work eval - recs NAREN  palliative care eval, regarding goal of care, pt full code at this time   Neuro appreciated  Await MRI

## 2019-12-30 NOTE — PROGRESS NOTE ADULT - SUBJECTIVE AND OBJECTIVE BOX
Interval History:  no change  MEDICATIONS  (STANDING):  acetaminophen   Tablet .. 650 milliGRAM(s) Oral every 6 hours  aspirin  chewable 81 milliGRAM(s) Oral daily  atorvastatin 80 milliGRAM(s) Oral at bedtime  clopidogrel Tablet 75 milliGRAM(s) Oral daily  ergocalciferol Drops 2000 Unit(s) Oral daily  influenza   Vaccine 0.5 milliLiter(s) IntraMuscular once  lidocaine   Patch 1 Patch Transdermal daily    MEDICATIONS  (PRN):  traMADol 25 milliGRAM(s) Oral every 6 hours PRN Moderate Pain (4 - 6)      Allergies    No Known Allergies    Intolerances        PHYSICAL EXAM:  Vital Signs Last 24 Hrs  T(F): 97.6 (12-30-19 @ 08:01)  HR: 93 (12-30-19 @ 08:01)  BP: 177/118 (12-30-19 @ 08:01)  RR: 18 (12-30-19 @ 08:01)      NERVOUS SYSTEM:  Alert & awake, follows simple commands. face symmetrical,  Motor Strength 5/5 right side, left side 0/5. LABS:                        13.5   5.76  )-----------( 362      ( 30 Dec 2019 08:01 )             42.1     12-30    133<L>  |  95<L>  |  19.0  ----------------------------<  120<H>  4.0   |  24.0  |  0.84    Ca    11.1<H>      30 Dec 2019 08:01  Phos  3.6     12-30  Mg     2.4     12-30    TPro  7.9  /  Alb  4.3  /  TBili  0.4  /  DBili  x   /  AST  92<H>  /  ALT  117<H>  /  AlkPhos  119  12-28    PT/INR - ( 28 Dec 2019 20:19 )   PT: 12.0 sec;   INR: 1.04 ratio         PTT - ( 28 Dec 2019 20:19 )  PTT:24.2 sec      RADIOLOGY & ADDITIONAL STUDIES:

## 2019-12-30 NOTE — PROGRESS NOTE ADULT - ASSESSMENT
right hemipshere cva with severe left sided weakness, possibly related to severe right supraclinoid carotis stenosis, patient daughter refused intervention to open the artery , management is limited to asa , plavix, statin, waitng for reapt mri, will need rehab.

## 2019-12-30 NOTE — PROGRESS NOTE ADULT - SUBJECTIVE AND OBJECTIVE BOX
CHIEF COMPLAINT/INTERVAL HISTORY:    Patient is a 74y old  Female who presents with a chief complaint of CVA (30 Dec 2019 11:44)      HPI:  74yoF  pmhx of HTN, prior CVA with residual left sided weakness, recently admitted for right sided CVA and high graded right ICA stenosis discharged to [facility] on 12/27 presented to ED for worsening left sided facial droop, aphasia, right gaze preference, and left hemiplegia.  Pt [place]ian Creople speaking, knows limited English, Daughter Faye and grandson at bedside. Daughter provided hx, pt was noted to have worsening left facial droop and slurred speech yesterday noted by daughter at rehab center. In the ED pt was code stroke, evaluated by neurology, not tpa candidate, CT head showed  expected changes from acute/subacute right frontoparietal infarction with local mass effect and compression of the right lateral ventricle. No midline shift or CT evidence of hemorrhagic conversion. Dr. Leone with Croatian  offered neurovascular intervention but daughter Faye and patient decline any invasive procedures at this time, wish to keep patient full code. Denies cp, sob, palpitations, fever, chills. (29 Dec 2019 01:54)      SUBJECTIVE & OBJECTIVE/ ROS: Pt seen and examined at bedside. Daughetr at bedside. c/o right sided eye deviation which is new.  Left sided weakness persists. PT recs NAREN. MRI pending.     ICU Vital Signs Last 24 Hrs  T(C): 36.6 (30 Dec 2019 16:19), Max: 37.1 (30 Dec 2019 00:26)  T(F): 97.8 (30 Dec 2019 16:19), Max: 98.7 (30 Dec 2019 00:26)  HR: 104 (30 Dec 2019 16:19) (91 - 105)  BP: 161/117 (30 Dec 2019 16:19) (148/104 - 177/118)  BP(mean): --  ABP: --  ABP(mean): --  RR: 18 (30 Dec 2019 16:19) (18 - 18)  SpO2: 99% (30 Dec 2019 16:19) (98% - 100%)        MEDICATIONS  (STANDING):  acetaminophen   Tablet .. 650 milliGRAM(s) Oral every 6 hours  amLODIPine   Tablet 10 milliGRAM(s) Oral daily  aspirin  chewable 81 milliGRAM(s) Oral daily  atorvastatin 80 milliGRAM(s) Oral at bedtime  clopidogrel Tablet 75 milliGRAM(s) Oral daily  ergocalciferol Drops 2000 Unit(s) Oral daily  influenza   Vaccine 0.5 milliLiter(s) IntraMuscular once  labetalol 100 milliGRAM(s) Oral three times a day  lidocaine   Patch 1 Patch Transdermal daily  losartan 100 milliGRAM(s) Oral daily    MEDICATIONS  (PRN):  hydrALAZINE Injectable 10 milliGRAM(s) IV Push every 6 hours PRN for SBP > 160  traMADol 25 milliGRAM(s) Oral every 6 hours PRN Moderate Pain (4 - 6)      LABS:                        13.5   5.76  )-----------( 362      ( 30 Dec 2019 08:01 )             42.1     12-30    133<L>  |  95<L>  |  19.0  ----------------------------<  120<H>  4.0   |  24.0  |  0.84    Ca    11.1<H>      30 Dec 2019 08:01  Phos  3.6     12-30  Mg     2.4     12-30    TPro  7.9  /  Alb  4.3  /  TBili  0.4  /  DBili  x   /  AST  92<H>  /  ALT  117<H>  /  AlkPhos  119  12-28    PT/INR - ( 28 Dec 2019 20:19 )   PT: 12.0 sec;   INR: 1.04 ratio         PTT - ( 28 Dec 2019 20:19 )  PTT:24.2 sec      CAPILLARY BLOOD GLUCOSE          RECENT CULTURES:      RADIOLOGY & ADDITIONAL TESTS:      PHYSICAL EXAM:    GENERAL: NAD, well-groomed, well-developed  HEAD:  Atraumatic, Normocephalic  EYES: EOMI, PERRLA, conjunctiva and sclera clear  NECK: Supple, No JVD  NERVOUS SYSTEM:  awake, alert, aphasic, dysarthria, left facial droop, LUE & LLE 0/5, RLE/RUE 5/5, sensation intact, right sided gaze preferance   CHEST/LUNG: Clear to percussion bilaterally; No rales, rhonchi, wheezing, or rubs  HEART: Regular rate and rhythm; No murmurs, rubs, or gallops  ABDOMEN: Soft, Nontender, Nondistended; Bowel sounds present  EXTREMITIES:  2+ Peripheral Pulses, No clubbing, cyanosis, or edema  SKIN: No rashes or lesions.

## 2019-12-30 NOTE — PROGRESS NOTE ADULT - ASSESSMENT
74yoF  pmhx of HTN, prior CVA with residual left sided weakness, recently admitted for right sided CVA and high graded right ICA stenosis discharged to [facility] on 12/27 presented to ED for worsening left sided facial droop, aphasia, right gaze preference, and left hemiplegia.

## 2019-12-31 ENCOUNTER — TRANSCRIPTION ENCOUNTER (OUTPATIENT)
Age: 74
End: 2019-12-31

## 2019-12-31 VITALS
SYSTOLIC BLOOD PRESSURE: 140 MMHG | HEART RATE: 87 BPM | OXYGEN SATURATION: 99 % | DIASTOLIC BLOOD PRESSURE: 93 MMHG | TEMPERATURE: 98 F | RESPIRATION RATE: 18 BRPM

## 2019-12-31 DIAGNOSIS — Z71.89 OTHER SPECIFIED COUNSELING: ICD-10-CM

## 2019-12-31 DIAGNOSIS — R47.01 APHASIA: ICD-10-CM

## 2019-12-31 DIAGNOSIS — I63.9 CEREBRAL INFARCTION, UNSPECIFIED: ICD-10-CM

## 2019-12-31 DIAGNOSIS — G83.24 MONOPLEGIA OF UPPER LIMB AFFECTING LEFT NONDOMINANT SIDE: ICD-10-CM

## 2019-12-31 LAB
ANION GAP SERPL CALC-SCNC: 10 MMOL/L — SIGNIFICANT CHANGE UP (ref 5–17)
BASOPHILS # BLD AUTO: 0.02 K/UL — SIGNIFICANT CHANGE UP (ref 0–0.2)
BASOPHILS NFR BLD AUTO: 0.4 % — SIGNIFICANT CHANGE UP (ref 0–2)
BUN SERPL-MCNC: 24 MG/DL — HIGH (ref 8–20)
CALCIUM SERPL-MCNC: 10.2 MG/DL — SIGNIFICANT CHANGE UP (ref 8.6–10.2)
CHLORIDE SERPL-SCNC: 96 MMOL/L — LOW (ref 98–107)
CO2 SERPL-SCNC: 23 MMOL/L — SIGNIFICANT CHANGE UP (ref 22–29)
CREAT SERPL-MCNC: 0.78 MG/DL — SIGNIFICANT CHANGE UP (ref 0.5–1.3)
EOSINOPHIL # BLD AUTO: 0.03 K/UL — SIGNIFICANT CHANGE UP (ref 0–0.5)
EOSINOPHIL NFR BLD AUTO: 0.6 % — SIGNIFICANT CHANGE UP (ref 0–6)
GLUCOSE SERPL-MCNC: 124 MG/DL — HIGH (ref 70–115)
HCT VFR BLD CALC: 40.9 % — SIGNIFICANT CHANGE UP (ref 34.5–45)
HGB BLD-MCNC: 12.9 G/DL — SIGNIFICANT CHANGE UP (ref 11.5–15.5)
IMM GRANULOCYTES NFR BLD AUTO: 0.6 % — SIGNIFICANT CHANGE UP (ref 0–1.5)
LYMPHOCYTES # BLD AUTO: 1.45 K/UL — SIGNIFICANT CHANGE UP (ref 1–3.3)
LYMPHOCYTES # BLD AUTO: 29.4 % — SIGNIFICANT CHANGE UP (ref 13–44)
MAGNESIUM SERPL-MCNC: 2.4 MG/DL — SIGNIFICANT CHANGE UP (ref 1.6–2.6)
MCHC RBC-ENTMCNC: 27.4 PG — SIGNIFICANT CHANGE UP (ref 27–34)
MCHC RBC-ENTMCNC: 31.5 GM/DL — LOW (ref 32–36)
MCV RBC AUTO: 87 FL — SIGNIFICANT CHANGE UP (ref 80–100)
MONOCYTES # BLD AUTO: 0.59 K/UL — SIGNIFICANT CHANGE UP (ref 0–0.9)
MONOCYTES NFR BLD AUTO: 12 % — SIGNIFICANT CHANGE UP (ref 2–14)
NEUTROPHILS # BLD AUTO: 2.81 K/UL — SIGNIFICANT CHANGE UP (ref 1.8–7.4)
NEUTROPHILS NFR BLD AUTO: 57 % — SIGNIFICANT CHANGE UP (ref 43–77)
PHOSPHATE SERPL-MCNC: 3.5 MG/DL — SIGNIFICANT CHANGE UP (ref 2.4–4.7)
PLATELET # BLD AUTO: 248 K/UL — SIGNIFICANT CHANGE UP (ref 150–400)
POTASSIUM SERPL-MCNC: 4.5 MMOL/L — SIGNIFICANT CHANGE UP (ref 3.5–5.3)
POTASSIUM SERPL-SCNC: 4.5 MMOL/L — SIGNIFICANT CHANGE UP (ref 3.5–5.3)
RBC # BLD: 4.7 M/UL — SIGNIFICANT CHANGE UP (ref 3.8–5.2)
RBC # FLD: 13 % — SIGNIFICANT CHANGE UP (ref 10.3–14.5)
SODIUM SERPL-SCNC: 129 MMOL/L — LOW (ref 135–145)
WBC # BLD: 4.93 K/UL — SIGNIFICANT CHANGE UP (ref 3.8–10.5)
WBC # FLD AUTO: 4.93 K/UL — SIGNIFICANT CHANGE UP (ref 3.8–10.5)

## 2019-12-31 PROCEDURE — 97167 OT EVAL HIGH COMPLEX 60 MIN: CPT

## 2019-12-31 PROCEDURE — 93005 ELECTROCARDIOGRAM TRACING: CPT

## 2019-12-31 PROCEDURE — 86850 RBC ANTIBODY SCREEN: CPT

## 2019-12-31 PROCEDURE — 99222 1ST HOSP IP/OBS MODERATE 55: CPT

## 2019-12-31 PROCEDURE — 96374 THER/PROPH/DIAG INJ IV PUSH: CPT

## 2019-12-31 PROCEDURE — 99497 ADVNCD CARE PLAN 30 MIN: CPT | Mod: 25

## 2019-12-31 PROCEDURE — 36415 COLL VENOUS BLD VENIPUNCTURE: CPT

## 2019-12-31 PROCEDURE — 85027 COMPLETE CBC AUTOMATED: CPT

## 2019-12-31 PROCEDURE — 82962 GLUCOSE BLOOD TEST: CPT

## 2019-12-31 PROCEDURE — 70551 MRI BRAIN STEM W/O DYE: CPT

## 2019-12-31 PROCEDURE — 99291 CRITICAL CARE FIRST HOUR: CPT | Mod: 25

## 2019-12-31 PROCEDURE — 99239 HOSP IP/OBS DSCHRG MGMT >30: CPT

## 2019-12-31 PROCEDURE — 85610 PROTHROMBIN TIME: CPT

## 2019-12-31 PROCEDURE — 80053 COMPREHEN METABOLIC PANEL: CPT

## 2019-12-31 PROCEDURE — 84100 ASSAY OF PHOSPHORUS: CPT

## 2019-12-31 PROCEDURE — 70496 CT ANGIOGRAPHY HEAD: CPT

## 2019-12-31 PROCEDURE — 84484 ASSAY OF TROPONIN QUANT: CPT

## 2019-12-31 PROCEDURE — 70498 CT ANGIOGRAPHY NECK: CPT

## 2019-12-31 PROCEDURE — 85730 THROMBOPLASTIN TIME PARTIAL: CPT

## 2019-12-31 PROCEDURE — 83735 ASSAY OF MAGNESIUM: CPT

## 2019-12-31 PROCEDURE — 92610 EVALUATE SWALLOWING FUNCTION: CPT

## 2019-12-31 PROCEDURE — 70450 CT HEAD/BRAIN W/O DYE: CPT

## 2019-12-31 PROCEDURE — 86901 BLOOD TYPING SEROLOGIC RH(D): CPT

## 2019-12-31 PROCEDURE — 80048 BASIC METABOLIC PNL TOTAL CA: CPT

## 2019-12-31 PROCEDURE — 86900 BLOOD TYPING SEROLOGIC ABO: CPT

## 2019-12-31 PROCEDURE — 0042T: CPT

## 2019-12-31 RX ORDER — LOSARTAN POTASSIUM 100 MG/1
1 TABLET, FILM COATED ORAL
Qty: 30 | Refills: 2
Start: 2019-12-31 | End: 2020-03-29

## 2019-12-31 RX ORDER — TRAMADOL HYDROCHLORIDE 50 MG/1
0.5 TABLET ORAL
Qty: 60 | Refills: 0
Start: 2019-12-31 | End: 2020-01-29

## 2019-12-31 RX ORDER — ASPIRIN/CALCIUM CARB/MAGNESIUM 324 MG
1 TABLET ORAL
Qty: 30 | Refills: 2
Start: 2019-12-31 | End: 2020-03-29

## 2019-12-31 RX ORDER — LOSARTAN POTASSIUM 100 MG/1
1 TABLET, FILM COATED ORAL
Qty: 0 | Refills: 0 | DISCHARGE

## 2019-12-31 RX ORDER — LABETALOL HCL 100 MG
1 TABLET ORAL
Qty: 90 | Refills: 2
Start: 2019-12-31 | End: 2020-03-29

## 2019-12-31 RX ORDER — ATORVASTATIN CALCIUM 80 MG/1
1 TABLET, FILM COATED ORAL
Qty: 30 | Refills: 2
Start: 2019-12-31 | End: 2020-03-29

## 2019-12-31 RX ORDER — AMLODIPINE BESYLATE 2.5 MG/1
1 TABLET ORAL
Qty: 30 | Refills: 2
Start: 2019-12-31 | End: 2020-03-29

## 2019-12-31 RX ORDER — CLOPIDOGREL BISULFATE 75 MG/1
1 TABLET, FILM COATED ORAL
Qty: 30 | Refills: 2
Start: 2019-12-31 | End: 2020-03-29

## 2019-12-31 RX ADMIN — CLOPIDOGREL BISULFATE 75 MILLIGRAM(S): 75 TABLET, FILM COATED ORAL at 15:04

## 2019-12-31 RX ADMIN — ERGOCALCIFEROL 2000 UNIT(S): 1.25 CAPSULE ORAL at 15:03

## 2019-12-31 RX ADMIN — AMLODIPINE BESYLATE 10 MILLIGRAM(S): 2.5 TABLET ORAL at 06:17

## 2019-12-31 RX ADMIN — LOSARTAN POTASSIUM 100 MILLIGRAM(S): 100 TABLET, FILM COATED ORAL at 06:17

## 2019-12-31 RX ADMIN — Medication 100 MILLIGRAM(S): at 16:11

## 2019-12-31 RX ADMIN — Medication 81 MILLIGRAM(S): at 15:04

## 2019-12-31 RX ADMIN — Medication 650 MILLIGRAM(S): at 15:04

## 2019-12-31 RX ADMIN — Medication 100 MILLIGRAM(S): at 06:17

## 2019-12-31 RX ADMIN — LIDOCAINE 1 PATCH: 4 CREAM TOPICAL at 15:04

## 2019-12-31 RX ADMIN — Medication 650 MILLIGRAM(S): at 06:17

## 2019-12-31 NOTE — DISCHARGE NOTE PROVIDER - NSDCCPCAREPLAN_GEN_ALL_CORE_FT
PRINCIPAL DISCHARGE DIAGNOSIS  Diagnosis: CVA (cerebral vascular accident)  Assessment and Plan of Treatment: Follow up with your primary doctor within 1 week of discharge & neurology in 2 weeks. Continue with home meds as directed

## 2019-12-31 NOTE — OCCUPATIONAL THERAPY INITIAL EVALUATION ADULT - MANUAL MUSCLE TESTING RESULTS, REHAB EVAL
right shoulder grossly assessed with AROM against gravity 3/5, right elbow grossly assessed with AROM against gravity 3/5, right gross grasp 4/5;

## 2019-12-31 NOTE — DISCHARGE NOTE PROVIDER - CARE PROVIDER_API CALL
Ernie Tyson)  Neurology  [address] [place] [address]  Phone: [phone]  Fax: [phone]  Follow Up Time:

## 2019-12-31 NOTE — ED ADULT NURSE REASSESSMENT NOTE - NS ED NURSE REASSESS COMMENT FT1
Pt care assumed from off going RN. Charting as noted. Pt with no apparent distress at this time. Airway patent, breathing spontaneous and nonlabored. Pt with left sided weakness and facial droop. Pt placed on cardiac monitor. Awaiting bed.

## 2019-12-31 NOTE — OCCUPATIONAL THERAPY INITIAL EVALUATION ADULT - PHYSICAL ASSIST/NONPHYSICAL ASSIST: SUPINE/SIT, REHAB EVAL
nonverbal cues (demo/gestures)/2 person assist/1 person assist/verbal cues 1 person assist/nonverbal cues (demo/gestures)/verbal cues

## 2019-12-31 NOTE — OCCUPATIONAL THERAPY INITIAL EVALUATION ADULT - PRECAUTIONS/LIMITATIONS, REHAB EVAL
head of bed 30 degrees, supervision with meals/fall precautions/aspiration precautions/seizure precautions

## 2019-12-31 NOTE — CONSULT NOTE ADULT - ATTENDING COMMENTS
Patient seen and examined at bedside this AM. CTA reviewed. She seems weaker on today's examination than when previously seen. She is awake and talking however. CTA showed critical intracranial MALA stenosis. Patient was started on DAPT for an optimized medical management treatment plan. In light of worsening symptoms patient returned. Emergent intracranial stenting option was given to the family and patient. Discussed via phone with patient's daughter. Acute intervention was declined by the patient and family. PA witnessed.    Continue ASA/Plavix. Maximum statin therapy  keep SBP elevated- may be even above 160 and maintain adequate volume status.
COUNSELING:    Face to face meeting to discuss Advanced Care Planning - Time Spent 25______ Minutes.  See goals of care note.    More than 50% time spent in counseling and coordinating care. ______ Minutes.     Thank you for the opportunity to assist with the care of this patient.   [facility] [phone].

## 2019-12-31 NOTE — OCCUPATIONAL THERAPY INITIAL EVALUATION ADULT - MODIFIED CLINICAL TEST OF SENSORY INTEGRATION IN BALANCE TEST
static sitting edge of bed with minimum assistance with left lateral and posterior lean; dynamic sitting edge of bed initially with maximum assistance however pt with increased performance overtime requiring only minimum/moderate assistance; standing not assessed

## 2019-12-31 NOTE — DISCHARGE NOTE NURSING/CASE MANAGEMENT/SOCIAL WORK - PATIENT PORTAL LINK FT
You can access the FollowMyHealth Patient Portal offered by [facility] by registering at the following website: [email or web address]. By joining [email or web address] FollowMyHealth portal, you will also be able to view your health information using other applications (apps) compatible with our system.

## 2019-12-31 NOTE — DISCHARGE NOTE PROVIDER - NSDCMRMEDTOKEN_GEN_ALL_CORE_FT
acetaminophen 325 mg oral tablet: 2 tab(s) orally every 6 hours, As needed, Temp greater or equal to 38C (100.4F), Mild Pain (1 - 3), Moderate Pain (4 - 6)  amLODIPine 10 mg oral tablet: 1 tab(s) orally once a day  aspirin 81 mg oral tablet, chewable: 1 tab(s) orally once a day  atorvastatin 80 mg oral tablet: 1 tab(s) orally once a day  clopidogrel 75 mg oral tablet: 1 tab(s) orally once a day  ergocalciferol 8000 intl units/mL oral solution: 2000 unit(s) orally once a day  labetalol 100 mg oral tablet: 1 tab(s) orally 3 times a day  losartan 100 mg oral tablet: 1 tab(s) orally once a day  traMADol 50 mg oral tablet: 0.5 tab(s) orally every 6 hours, As needed, Moderate Pain (4 - 6) MDD:100 mg

## 2019-12-31 NOTE — OCCUPATIONAL THERAPY INITIAL EVALUATION ADULT - PERTINENT HX OF CURRENT PROBLEM, REHAB EVAL
Pt presents to the ED from [facility] with c/o slurred speech, left sided facial droop, left sided weakness and a fall (+ head trauma). Pt was at [facility] about one week prior with similar symptoms however reports they had improved prior to discharge to rehab. Head MRI reveals moderately extensive right hemispheric acute infarct involving proximal middle and anterior cerebral artery distribution; involvement is overall more extensive since the previous MRI.

## 2019-12-31 NOTE — DISCHARGE NOTE PROVIDER - HOSPITAL COURSE
74yoF  pmhx of HTN, prior CVA with residual left sided weakness, recently admitted for right sided CVA and high graded right ICA stenosis discharged to [facility] on 12/27 presented to ED for worsening left sided facial droop, aphasia, right gaze preference, and left hemiplegia.          Problem/Plan - 1:    ·  Problem: CVA (cerebral vascular accident).  Plan: acute on subacute right frontoparietal infarct and severe right supraclinoid ICA stenosis    not tpa candidate    Neuro consult Dr. Leone noted, pt and daughter refused neurovascular intervention, risk and benefits was explained to daughter Faye and patient with primary patient's language Niuean creole        pt high risk for hemorrhagic conversion while on asa and plavix, need close monitoring    cont with asa, high intensity atorvastatin, plavix    Pureed with thins per S & S eval    neuro checks Q4     pt/ot and social work eval - recs [facility]    palliative care eval, regarding goal of care, pt full code at this time     Neuro appreciated    MRI brain: Moderately extensive right hemispheric acute infarct involving proximal middle and anterior cerebral artery distribution. Involvement is overall more extensive since the previous MRI.    Results discussed with Neurology Dr. Tyson & daughter. Medical management only. Daughter wants to take her home, refuses [facility]. Hospice services will be offered as outpateint. 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  Plan: right supraclinoid high grade ICA stenosis    refused neurovascular intervention     rest of plan as above.          Problem/Plan - 3:    ·  Problem: Essential hypertension.  Plan: hold home BP meds for now to permit permissive HTN.          Problem/Plan - 4:    ·  Problem: Chronic low back pain, unspecified back pain laterality, unspecified whether sciatica present.  Plan: cont with tramadol, tryleno and Lidoderm patch.         PHYSICAL EXAM:        GENERAL: NAD, well-groomed, well-developed    HEAD:  Atraumatic, Normocephalic    EYES: EOMI, PERRLA, conjunctiva and sclera clear    NECK: Supple, No JVD    NERVOUS SYSTEM:  awake, alert, aphasic, dysarthria, left facial droop, LUE & LLE 0/5, RLE/RUE 5/5, sensation intact, right sided gaze preferance     CHEST/LUNG: Clear to percussion bilaterally; No rales, rhonchi, wheezing, or rubs    HEART: Regular rate and rhythm; No murmurs, rubs, or gallops    ABDOMEN: Soft, Nontender, Nondistended; Bowel sounds present    EXTREMITIES:  2+ Peripheral Pulses, No clubbing, cyanosis, or edema    SKIN: No rashes or lesions

## 2019-12-31 NOTE — ED ADULT NURSE NOTE - CHIEF COMPLAINT QUOTE
patient d/c yesterday after stroke. as per daughter was left with left side weakness. states patient was improving when she was discharged. today at [facility] daughter noticed worsening slurred speech with facial droop in increased left sided weakness. MD at bedside and code stroke initiated

## 2019-12-31 NOTE — ED ADULT NURSE REASSESSMENT NOTE - NS ED NURSE REASSESS COMMENT FT1
amb[facility]lette service came for pick-up unable to take pt due to not being appropriate for wheelchair pt unable to walk, needs ambulance for , spoke with SW for ambulance pickup, pt awake and alert follows most commands and at baseline, will continue to monitor

## 2019-12-31 NOTE — CONSULT NOTE ADULT - ASSESSMENT
74 year old female with acute on subacute right frontoparietal infarct and severe right supraclinoid ICA stenosis    with chino  on the ER provider phone offered neurovascular intervention with Dr. Leone and daughter Faye and patient knowing risks and benefits have chosen to decline any invasive procedures at this time.   recommend permissive HTN to sbp 200. there is a risk of intracranial hemorrhage while on asa and plavix however patient needs cerebral perfusion.   recommend PT   failed dysphagia screen- discuss goals of care with family regarding peg.   prognosis guarded.
Impression;  Recent CVA R hemispheric.  Assess for recurrent CVA      Plan:    On ASA/Plavix regimen.  Stroke protocol/unit.  Await for repeat MRI Brain with dwi to assess cva.  DVT prophylaxis.  PT/OT/Speech/Swallow.  Risk factor management.  Will follow.
75 YO F S/P 2nd Cerebral infarction with serious Neuro deficits.    Plan:    Met briefly with daughter Faye Botello  Who agreed to sign MOLST Directive DNR/DNI  Transport home to her care.  Interested in Hospice Services at home- which I will refer to do home evaluation on Thursday.

## 2019-12-31 NOTE — OCCUPATIONAL THERAPY INITIAL EVALUATION ADULT - RANGE OF MOTION EXAMINATION, UPPER EXTREMITY
Left UE Passive ROM was WFL  (within functional limits)/left UE with no AROM in all planes, pt with no shoulder shrugs AROM/Right UE Active ROM was WFL (within functional limits)

## 2019-12-31 NOTE — CONSULT NOTE ADULT - PROBLEM SELECTOR RECOMMENDATION 5
MOLST completed and given to daughter at time of discharge  Will arrange Home hospice visit to set up home services

## 2019-12-31 NOTE — OCCUPATIONAL THERAPY INITIAL EVALUATION ADULT - NS ASR FOLLOW COMMAND OT EVAL
75% of the time/able to follow single-step instructions/pt requires increased time and repetition to process commands of tasks; pt requires cues to sequence and problem solve tasks/mobility

## 2019-12-31 NOTE — ED ADULT NURSE REASSESSMENT NOTE - NIH STROKE SCALE: 2. BEST GAZE, QM
(1) Partial gaze palsy; gaze is abnormal in one or both eyes, but forced deviation or total gaze paresis is not present
(0) Normal

## 2019-12-31 NOTE — CONSULT NOTE ADULT - SUBJECTIVE AND OBJECTIVE BOX
HPI: This is a 75yo F readmitted wnext day after discharge with worsening  Left sided facial droop, aphasia, right gaze preference and L hemiplegia.  Seen in ED just before discharge home.    Patient awake sitting up at 90 degrees on stretcher moaning. PMH of chroniic LBP HTN, Stenosis of R carotid artery with cerebral infarction, CVA.    74yoF  pmhx of HTN, prior CVA with residual left sided weakness, recently admitted for right sided CVA and high graded right ICA stenosis discharged to [facility] on 12/27 presented to ED for worsening left sided facial droop, aphasia, right gaze preference, and left hemiplegia.  Pt Bhutanese Creople speaking, knows limited English, Daughter Faye and grandson at bedside. Daughter provided hx, pt was noted to have worsening left facial droop and slurred speech yesterday noted by daughter at rehab center. In the ED pt was code stroke, evaluated by neurology, not tpa candidate, CT head showed  expected changes from acute/subacute right frontoparietal infarction with local mass effect and compression of the right lateral ventricle. No midline shift or CT evidence of hemorrhagic conversion. Dr. Leone with Afghan  offered neurovascular intervention but daughter Faye and patient decline any invasive procedures at this time, wish to keep patient full code. Denies cp, sob, palpitations, fever, chills. (29 Dec 2019 01:54)      PERTINENT PMH REVIEWED: Yes     PAST MEDICAL & SURGICAL HISTORY:  Paralysis of left upper extremity  CVA (cerebral vascular accident)  No significant past surgical history      SOCIAL HISTORY:  EtOH No                                    Drugs  No                                      nonsmoker                                    Admitted from: home  [facility] ________Daughter Faye Botello [phone]    FAMILY HISTORY:  No pertinent family history in first degree relatives  No Known Allergies    Baseline ADLs (prior to admission):  Dependent      Present Symptoms:     Dyspnea: 0 1   Nausea/Vomiting: No  Anxiety:  Yes   Depression: Yes   Fatigue: Yes   Loss of appetite: Yes     Pain: not at time of eval            Character-            Duration-            Effect-            Factors-            Frequency-            Location-            Severity-    Review of Systems: Reviewed                       Unable to obtain due to poor mentation       MEDICATIONS  (STANDING):  acetaminophen   Tablet .. 650 milliGRAM(s) Oral every 6 hours  amLODIPine   Tablet 10 milliGRAM(s) Oral daily  aspirin  chewable 81 milliGRAM(s) Oral daily  atorvastatin 80 milliGRAM(s) Oral at bedtime  clopidogrel Tablet 75 milliGRAM(s) Oral daily  ergocalciferol Drops 2000 Unit(s) Oral daily  influenza   Vaccine 0.5 milliLiter(s) IntraMuscular once  labetalol 100 milliGRAM(s) Oral three times a day  lidocaine   Patch 1 Patch Transdermal daily  losartan 100 milliGRAM(s) Oral daily    MEDICATIONS  (PRN):  hydrALAZINE Injectable 10 milliGRAM(s) IV Push every 6 hours PRN for SBP > 160  traMADol 25 milliGRAM(s) Oral every 6 hours PRN Moderate Pain (4 - 6)      PHYSICAL EXAM:    Vital Signs Last 24 Hrs  T(C): 36.7 (31 Dec 2019 16:09), Max: 36.7 (31 Dec 2019 16:09)  T(F): 98 (31 Dec 2019 16:09), Max: 98 (31 Dec 2019 16:09)  HR: 87 (31 Dec 2019 16:09) (65 - 92)  BP: 140/93 (31 Dec 2019 16:09) (133/90 - 148/98)  BP(mean): --  RR: 18 (31 Dec 2019 16:09) (16 - 22)  SpO2: 99% (31 Dec 2019 16:09) (98% - 99%)    General: alert  ____ lethargic                   cachexia  nonverbal          HEENT:  dry mouth    Lungs: comfortable     CV: normal      GI: normal                   constipation  last BM:     :   incontinent      MSK: L hemiplegia  weakness                bedbound/wheelchair bound    Skin: normal  _  no rash    LABS:                        12.9   4.93  )-----------( 248      ( 31 Dec 2019 10:36 )             40.9     12-31    129<L>  |  96<L>  |  24.0<H>  ----------------------------<  124<H>  4.5   |  23.0  |  0.78    Ca    10.2      31 Dec 2019 10:36  Phos  3.5     12-31  Mg     2.4     12-31    I&O's Summary    RADIOLOGY & ADDITIONAL STUDIES:  < from: CT Angio Head w/ IV Cont (12.28.19 @ 20:08) >  IMPRESSION:  CT brain perfusion: Areas of perfusion deficits within the right frontal lobe known to correspond to recent acute/subacute infarct.    CTA neck: No stenosis. No dissection.    CTA brain: Multifocal stenoses as detailed above. Of note, there is severe stenosis of the right supraclinoid internal carotid artery.     COMMENT:   Reference per NASCET criteria for degree of stenosis: Mild: less than 50% stenosis. Moderate: 50-69% stenosis. Severe: 70-94% stenosis. Near occlusion: 95-99% stenosis.    Per RAPID assessment for acute infarct, threshold for ischemic tissue volume is Tmax > 6 sec. Threshold for infarct core volume estimate is CBF < 30 percent. Threshold for poor collateral flow or severe delayed perfusion is Tmax > 10 sec.      ADVANCE DIRECTIVES:   DNR NO  Completed on:                     MOLST  YES   Completed on: 12/31/2019 at time of discharge home  Living Will   NO   Completed on:

## 2019-12-31 NOTE — OCCUPATIONAL THERAPY INITIAL EVALUATION ADULT - PLANNED THERAPY INTERVENTIONS, OT EVAL
toilet/fine motor coordination training/strengthening/transfer training/bed mobility training/cognitive, visual perceptual/joint mobilization/ADL retraining/balance training/ROM

## 2019-12-31 NOTE — OCCUPATIONAL THERAPY INITIAL EVALUATION ADULT - MUSCLE TONE ASSESSMENT, REHAB EVAL
right UE normal; left UE with increased tone throughout shoulder/elbow, left wrist/hand with flaccidity

## 2019-12-31 NOTE — OCCUPATIONAL THERAPY INITIAL EVALUATION ADULT - VISUAL ACUITY
pt denies changes in vision; pt with slight inattention to left side noted pt denies changes in vision; pt noted to have slight left side inattention requiring cues for head position to scan environment and left UE positioning

## 2020-01-01 ENCOUNTER — OUTPATIENT (OUTPATIENT)
Dept: OUTPATIENT SERVICES | Facility: HOSPITAL | Age: 75
LOS: 1 days | End: 2020-01-01

## 2020-01-03 DIAGNOSIS — Z71.89 OTHER SPECIFIED COUNSELING: ICD-10-CM

## 2020-01-10 ENCOUNTER — APPOINTMENT (OUTPATIENT)
Dept: NEUROLOGY | Facility: CLINIC | Age: 75
End: 2020-01-10

## 2020-02-25 NOTE — ED ADULT TRIAGE NOTE - CHIEF COMPLAINT QUOTE
patient d/c yesterday after stroke. as per daughter was left with left side weakness. states patient was improving when she was discharged. today at [facility] daughter noticed worsening slurred speech with facial droop in increased left sided weakness. MD at bedside and code stroke initiated
verbal instruction/written material

## 2020-03-02 NOTE — ED ADULT TRIAGE NOTE - HEIGHT IN INCHES
Forty minutes post injection patient complained of itchy palms, and shins bilaterally. She reports some itchy in right ear canal and right side of throat as well. Patient took Benadryl 25 mg tablet at 1550.  At 1600 patient took Benadryl 25 mg liquid, a 9

## 2020-04-13 ENCOUNTER — APPOINTMENT (OUTPATIENT)
Dept: INTERNAL MEDICINE | Facility: CLINIC | Age: 75
End: 2020-04-13

## 2020-07-14 NOTE — ED ADULT NURSE NOTE - IN THE PAST 12 MONTHS HAVE YOU USED DRUGS OTHER THAN THOSE REQUIRED FOR MEDICAL REASON?
Ongoing SW/CM Assessment/Plan of Care Note     See SW/CM flowsheets for goals and other objective data.    Patient/Family discharge goal (s):  Goal #1: Psychosocial needs assessed  Goal #2: Establish present or future health care decison-maker  Goal #3: Communication facilitated    PT Recommendation:  Recommendation for Discharge: PT WI: Sub-acute nursing home    OT Recommendation:  Recommendations for Discharge: OT WI: Sub-acute nursing home, Other (comment)(Spouse would like return home for pt. , support of family)    SLP Recommendation:  Recommendations for Discharge: SLP: SNF/ 24-7 supervision, home speech    Disposition:       Progress note:   SW following for discharge planning. Record reviewed, case discussed in OFTs. Patient's wife Melinda came into the hospital Friday to meet with the team, work with PT, and learn about catheter care. Today patient exhibits confusion and is not eating. Patient did not go down for HBO today due to lethargy, and there are plans for a NG tube. Discharge timing unclear.    ANAMARIA called patient's wife Melinda, per message relayed from last night's RN. Melinda reports that she had an extensive conversation with MD this morning about goals of care and whether they wanted to pursue treatment. Melinda reports that at this point, she is strongly considering bringing patient home under a palliative/hospice scenario. Melinda says that she does not feel that Brendan would want to live in his current state, given prognosis and quality of life. Melinda is aware that MD has placed an order for an introductory conversation with [facility], and anticipates coordinating with her children to discuss. Melinda has questions about a hospital bed, and a decision not to pursue more HBO. She expresses interest in talking more about these topics with hospice liaison.     ANAMARIA relayed message back to [facility] hospice liaison, as she had been trying to reach Melinda earlier today.       staff continue to follow and assist.  Anticipate DC home with support of wife and home care services.          No

## 2020-08-04 NOTE — DISCHARGE NOTE PROVIDER - NSDCHHHOMEBOUND_GEN_ALL_CORE
Refilled medication and e-scribed to pharmacy. Confirm prescription was due. Make sure PABLO and urine drug screen is up to date.
Zhao Conway called to request a refill on his medication. Last office visit : 3/6/2020   Next office visit : 9/15/2020     Last UDS:   Amphetamine Screen, Urine   Date Value Ref Range Status   08/09/2019 neg  Final     Barbiturate Screen, Urine   Date Value Ref Range Status   08/09/2019 neg  Final     Benzodiazepine Screen, Urine   Date Value Ref Range Status   08/09/2019 neg  Final     Buprenorphine Urine   Date Value Ref Range Status   08/09/2019 neg  Final     Cocaine Metabolite Screen, Urine   Date Value Ref Range Status   08/09/2019 neg  Final     Gabapentin Screen, Urine   Date Value Ref Range Status   08/09/2019 neg  Final     MDMA, Urine   Date Value Ref Range Status   08/09/2019 neg  Final     Methamphetamine, Urine   Date Value Ref Range Status   08/09/2019 neg  Final     Opiate Scrn, Ur   Date Value Ref Range Status   08/09/2019 pos  Final     Oxycodone Screen, Ur   Date Value Ref Range Status   08/09/2019 neg  Final     PCP Screen, Urine   Date Value Ref Range Status   08/09/2019 neg  Final     Propoxyphene Screen, Urine   Date Value Ref Range Status   08/09/2019 neg  Final     THC Screen, Urine   Date Value Ref Range Status   08/09/2019 neg  Final     Tricyclic Antidepressants, Urine   Date Value Ref Range Status   08/09/2019 neg  Final       Last Martín Horse: today  Medication Contract: 8/9/19   Last Fill: 2/28/20    Requested Prescriptions     Pending Prescriptions Disp Refills    temazepam (RESTORIL) 30 MG capsule [Pharmacy Med Name: Temazepam 30 MG Oral Capsule] 30 capsule 0     Sig: TAKE 1 CAPSULE BY MOUTH NIGHTLY AS NEEDED FOR SLEEP FOR 30 DAYS         Please approve or refuse this medication.    [facility]
Fall risk/Stroke/TBI (neurological/cognitive impairment)

## 2021-01-24 NOTE — ED ADULT TRIAGE NOTE - SOURCE OF INFORMATION
EMS Implemented All Universal Safety Interventions:  [place] to call system. Call bell, personal items and telephone within reach. Instruct patient to call for assistance. Room bathroom lighting operational. Non-slip footwear when patient is off stretcher. Physically safe environment: no spills, clutter or unnecessary equipment. Stretcher in lowest position, wheels locked, appropriate side rails in place.

## 2021-05-20 ENCOUNTER — APPOINTMENT (OUTPATIENT)
Dept: PHYSICAL MEDICINE AND REHAB | Facility: CLINIC | Age: 76
End: 2021-05-20
Payer: MEDICARE

## 2021-05-20 PROCEDURE — 99215 OFFICE O/P EST HI 40 MIN: CPT

## 2021-05-20 PROCEDURE — 99072 ADDL SUPL MATRL&STAF TM PHE: CPT

## 2021-06-10 ENCOUNTER — APPOINTMENT (OUTPATIENT)
Dept: PHYSICAL MEDICINE AND REHAB | Facility: CLINIC | Age: 76
End: 2021-06-10

## 2021-06-12 NOTE — ED ADULT NURSE NOTE - NIH STROKE SCALE: 2. BEST GAZE, QM
Yes (1) Partial gaze palsy; gaze is abnormal in one or both eyes, but forced deviation or total gaze paresis is not present

## 2021-06-17 ENCOUNTER — APPOINTMENT (OUTPATIENT)
Dept: PHYSICAL MEDICINE AND REHAB | Facility: CLINIC | Age: 76
End: 2021-06-17
Payer: MEDICARE

## 2021-06-17 PROCEDURE — 99072 ADDL SUPL MATRL&STAF TM PHE: CPT

## 2021-06-17 PROCEDURE — 20611 DRAIN/INJ JOINT/BURSA W/US: CPT | Mod: LT,59

## 2021-06-17 PROCEDURE — 95874 GUIDE NERV DESTR NEEDLE EMG: CPT

## 2021-06-17 PROCEDURE — 64642 CHEMODENERV 1 EXTREMITY 1-4: CPT

## 2021-07-15 ENCOUNTER — APPOINTMENT (OUTPATIENT)
Dept: PHYSICAL MEDICINE AND REHAB | Facility: CLINIC | Age: 76
End: 2021-07-15
Payer: MEDICARE

## 2021-07-15 PROCEDURE — 99213 OFFICE O/P EST LOW 20 MIN: CPT

## 2021-07-15 PROCEDURE — 99072 ADDL SUPL MATRL&STAF TM PHE: CPT

## 2021-09-16 ENCOUNTER — APPOINTMENT (OUTPATIENT)
Dept: PHYSICAL MEDICINE AND REHAB | Facility: CLINIC | Age: 76
End: 2021-09-16

## 2021-10-15 ENCOUNTER — APPOINTMENT (OUTPATIENT)
Dept: PHYSICAL MEDICINE AND REHAB | Facility: CLINIC | Age: 76
End: 2021-10-15
Payer: MEDICARE

## 2021-10-15 DIAGNOSIS — G81.14 SPASTIC HEMIPLEGIA AFFECTING LEFT NONDOMINANT SIDE: ICD-10-CM

## 2021-10-15 DIAGNOSIS — R26.9 UNSPECIFIED ABNORMALITIES OF GAIT AND MOBILITY: ICD-10-CM

## 2021-10-15 DIAGNOSIS — I63.9 CEREBRAL INFARCTION, UNSPECIFIED: ICD-10-CM

## 2021-10-15 DIAGNOSIS — Z78.9 OTHER REDUCED MOBILITY: ICD-10-CM

## 2021-10-15 DIAGNOSIS — Z74.09 OTHER REDUCED MOBILITY: ICD-10-CM

## 2021-10-15 PROCEDURE — 95874 GUIDE NERV DESTR NEEDLE EMG: CPT

## 2021-10-15 PROCEDURE — 64643 CHEMODENERV 1 EXTREM 1-4 EA: CPT

## 2021-10-15 PROCEDURE — 64642 CHEMODENERV 1 EXTREMITY 1-4: CPT

## 2021-11-17 ENCOUNTER — APPOINTMENT (OUTPATIENT)
Dept: PHYSICAL MEDICINE AND REHAB | Facility: CLINIC | Age: 76
End: 2021-11-17

## 2022-01-17 NOTE — OCCUPATIONAL THERAPY INITIAL EVALUATION ADULT - GENERAL OBSERVATIONS, REHAB EVAL
Sodium level at 118  Check Sodium urine random  Check Osmolality Urine  Case discussed with Nephrology   Concern for SIADH  Lasix 20 mg IV x 1  Fluid restriction 1 8 L daily  BMP q 4 hours  Monitor sodium levels  Monitor urinary output, renal function  Nephrology consult   Supportive care Received pt in semi-malin position in bed, +IV lock, +telemetry. Pt agrees to OT.

## 2022-08-01 NOTE — ED PROVIDER NOTE - CROS ED NEURO POS
[FreeTextEntry1] : Ms. Cunningham is an 81yo W with HTN, HL, MGUS, carotid arterial disease who was referred for statin intolerance and started on PCKS9i who now presents for follow-up. \par \par She has been tolerating the Praluent and it is one week since her third injection. Still reporting mild runny nose. \par \par She saw the nephrologist and her Valsartan was increased to 160mg daily. BP elevated today, but she reports white coat HTN. \par \par She is feeling well overall. Patient denies any chest pain, SOB, palpitations, orthopnea, PND or LE edema.\par 
DIFFICULTY WALKING / IMBALANCE

## 2023-05-01 NOTE — CONSULT NOTE ADULT - ASSESSMENT
Pt is a 75 y/o female with medical history of HTN, prior CVA who presents to [facility] to left sided facial droop and slurred speech. Pt states with assistance of , she fell at home 2 days ago. When her daughter saw he she noticed he face was distorted so she was brought to the hospital. Upon admission, pt was found to have acute to subacute infarct, and intracranial stenosis of right internal carotid artery on MRI and CT imaging. Pt had DANA completed which showed EF 55-60% EF Grade I diastolic dysfunction.   Pt currently presents with significant left sided weakness. Upon assessment, pt also c/o of sternal throbbing chest pain, raidiating to back, 10/10. Pt states "she feels like her heart is not beating". Pain is not reproducible upon palpation. Pt Denies fever, chills, cough, phlegm production, shortness of breath, dyspnea on exertion, orthopnea, PND, edema, palpitations, irregular, fast heart beat, nausea, vomiting, melena, rectal bleed, hematuria, lightheadedness, dizziness, syncope, near syncope.       1. CVA  - CT- evidence of right temporal occipital infarct, right intracranial artery stenosis  -PBL-QS-12-60%, grade one diastolic dysfunction  -Less likely cryptogenic stroke  -DANA to r/o cryptogenic stroke ordered, possible loop if +significant PFO, or thrombus  -F/U with neuro/Neuro IR      2.Chest Pain  -Stat ECG ordered and pending  -Stat Troponin and CK ordered and pending      3. HTN  -Start pt on home BP medication  -IV Hydralazine PRN  -Maintain SBP<150  -Consider starting Norvasc for BP control Pt is a 75 y/o female with medical history of HTN, prior CVA who presents to [facility] to left sided facial droop and slurred speech. Pt states with assistance of , she fell at home 2 days ago. When her daughter saw he she noticed he face was distorted so she was brought to the hospital. Upon admission, pt was found to have acute to subacute infarct, and intracranial stenosis of right internal carotid artery on MRI and CT imaging. Pt had DANA completed which showed EF 55-60% EF Grade I diastolic dysfunction.   Pt currently presents with significant left sided weakness. Upon assessment, pt also c/o of sternal throbbing chest pain, raidiating to back, 10/10. Pt states "she feels like her heart is not beating". Pain is not reproducible upon palpation. Pt Denies fever, chills, cough, phlegm production, shortness of breath, dyspnea on exertion, orthopnea, PND, edema, palpitations, irregular, fast heart beat, nausea, vomiting, melena, rectal bleed, hematuria, lightheadedness, dizziness, syncope, near syncope.       1. CVA  - CT- evidence of right temporal occipital infarct, right intracranial artery stenosis  -UAU-EC-80-60%, grade one diastolic dysfunction  -Less likely cryptogenic stroke  -DANA to r/o cryptogenic stroke ordered, possible loop if +significant PFO, or thrombus  -F/U with neuro/Neuro IR      2.Chest Pain  -Stat ECG ordered and pending  -Stat Troponin and CK ordered and pending      3. HTN  -Start pt on home BP medication  -IV Hydralazine PRN  -Maintain SB [id number]  -Consider starting Norvasc for BP control Acitretin Counseling:  I discussed with the patient the risks of acitretin including but not limited to hair loss, dry lips/skin/eyes, liver damage, hyperlipidemia, depression/suicidal ideation, photosensitivity.  Serious rare side effects can include but are not limited to pancreatitis, pseudotumor cerebri, bony changes, clot formation/stroke/heart attack.  Patient understands that alcohol is contraindicated since it can result in liver toxicity and significantly prolong the elimination of the drug by many years.

## 2023-10-02 NOTE — ED ADULT NURSE NOTE - NIH STROKE SCALE: 5B. MOTOR ARM, RIGHT, QM
--See respiratory failure   (2) Some effort against gravity; limb cannot get to or maintain (if cued) 90 (or 45) degrees, drifts down to bed, but has some effort against gravity

## 2023-10-25 NOTE — CONSULT NOTE ADULT - CONSULT REQUESTED DATE/TIME
22-Dec-2019 15:37
Pt ambulatory to bathroom. States pain is unchanged.   
21-Dec-2019 22:52
23-Dec-2019 11:22
23-Dec-2019 12:54
23-Dec-2019 15:41
23-Dec-2019 16:28
26-Dec-2019 11:19

## 2024-05-15 NOTE — OCCUPATIONAL THERAPY INITIAL EVALUATION ADULT - PERSONAL SAFETY AND JUDGMENT, REHAB EVAL
What Type Of Note Output Would You Prefer (Optional)?: Standard Output How Severe Is Your Skin Lesion?: mild Has Your Skin Lesion Been Treated?: not been treated Is This A New Presentation, Or A Follow-Up?: Skin Lesion impaired/impulsive at times, attempting to get up however able to redirect with cues

## 2024-10-02 NOTE — SWALLOW BEDSIDE ASSESSMENT ADULT - SWALLOW EVAL: RECOMMENDED DIET
PUREE with THIN liquids as tolerated
MEDICATIONS  (STANDING):  chlorhexidine 2% Cloths 1 Application(s) Topical daily  dexAMETHasone     Tablet 4 milliGRAM(s) Oral every 6 hours  FLUoxetine 80 milliGRAM(s) Oral at bedtime  furosemide    Tablet 40 milliGRAM(s) Oral daily  HYDROmorphone PCA (1 mG/mL) 30 milliLiter(s) PCA Continuous PCA Continuous  memantine 10 milliGRAM(s) Oral every 12 hours  methadone    Tablet 10 milliGRAM(s) Oral <User Schedule>  methadone    Tablet 15 milliGRAM(s) Oral <User Schedule>  methylphenidate 20 milliGRAM(s) Oral <User Schedule>  methylphenidate 20 milliGRAM(s) Oral <User Schedule>  octreotide  Injectable 100 MICROGram(s) SubCutaneous two times a day  pantoprazole    Tablet 40 milliGRAM(s) Oral every 12 hours  pregabalin 200 milliGRAM(s) Oral three times a day    MEDICATIONS  (PRN):  albuterol    90 MICROgram(s) HFA Inhaler 2 Puff(s) Inhalation every 6 hours PRN Shortness of Breath and/or Wheezing  diazepam    Tablet 2 milliGRAM(s) Oral at bedtime PRN insomnia  HYDROmorphone  Injectable 2 milliGRAM(s) IV Push every 3 hours PRN Severe Pain (7 - 10)  HYDROmorphone  Injectable 1 milliGRAM(s) IV Push every 4 hours PRN Breakthrough Pain  naloxone Injectable 0.1 milliGRAM(s) IV Push every 3 minutes PRN For ANY of the following changes in patient status:  A. RR LESS THAN 10 breaths per minute, B. Oxygen saturation LESS THAN 90%, C. Sedation score of 6  ondansetron Injectable 4 milliGRAM(s) IV Push every 6 hours PRN Nausea  polyethylene glycol 3350 17 Gram(s) Oral daily PRN Constipation  simethicone 80 milliGRAM(s) Chew four times a day PRN Gas

## 2025-05-01 ENCOUNTER — APPOINTMENT (OUTPATIENT)
Age: 80
End: 2025-05-01
Payer: MEDICARE

## 2025-05-01 ENCOUNTER — TRANSCRIPTION ENCOUNTER (OUTPATIENT)
Age: 80
End: 2025-05-01

## 2025-05-01 VITALS
RESPIRATION RATE: 16 BRPM | OXYGEN SATURATION: 98 % | SYSTOLIC BLOOD PRESSURE: 132 MMHG | TEMPERATURE: 98.2 F | WEIGHT: 175 LBS | HEIGHT: 69 IN | BODY MASS INDEX: 25.92 KG/M2 | HEART RATE: 68 BPM | DIASTOLIC BLOOD PRESSURE: 82 MMHG

## 2025-05-01 DIAGNOSIS — Z00.00 ENCOUNTER FOR GENERAL ADULT MEDICAL EXAMINATION W/OUT ABNORMAL FINDINGS: ICD-10-CM

## 2025-05-01 DIAGNOSIS — R93.89 ABNORMAL FINDINGS ON DIAGNOSTIC IMAGING OF OTHER SPECIFIED BODY STRUCTURES: ICD-10-CM

## 2025-05-01 DIAGNOSIS — N95.0 POSTMENOPAUSAL BLEEDING: ICD-10-CM

## 2025-05-01 PROCEDURE — 99204 OFFICE O/P NEW MOD 45 MIN: CPT

## 2025-05-01 PROCEDURE — 99459 PELVIC EXAMINATION: CPT

## 2025-05-01 RX ORDER — MISOPROSTOL 200 UG/1
200 TABLET ORAL
Qty: 1 | Refills: 0 | Status: ACTIVE | COMMUNITY
Start: 2025-05-01 | End: 1900-01-01

## 2025-05-07 LAB
CYTOLOGY CVX/VAG DOC THIN PREP: ABNORMAL
HPV HIGH+LOW RISK DNA PNL CVX: NOT DETECTED

## 2025-07-18 NOTE — ED PROVIDER NOTE - CADM POA CENTRAL LINE
Discussed Normal MRI results and improved seizure control.  Has improved speech  Adjust Keppra 4mls  mls BID   Valtoco rescue 10  mg for seizure > 5 mins  Monitor behavior   Continue other meds fro ADHD  Seizure precautions and ED discussed   Return to clinic in  4  months  
No